# Patient Record
Sex: FEMALE | Race: WHITE | NOT HISPANIC OR LATINO | URBAN - METROPOLITAN AREA
[De-identification: names, ages, dates, MRNs, and addresses within clinical notes are randomized per-mention and may not be internally consistent; named-entity substitution may affect disease eponyms.]

---

## 2023-08-15 ENCOUNTER — OFFICE VISIT (OUTPATIENT)
Dept: FAMILY MEDICINE CLINIC | Facility: CLINIC | Age: 58
End: 2023-08-15
Payer: COMMERCIAL

## 2023-08-15 VITALS
TEMPERATURE: 97.8 F | HEIGHT: 63 IN | DIASTOLIC BLOOD PRESSURE: 88 MMHG | WEIGHT: 142 LBS | HEART RATE: 64 BPM | BODY MASS INDEX: 25.16 KG/M2 | OXYGEN SATURATION: 95 % | SYSTOLIC BLOOD PRESSURE: 138 MMHG

## 2023-08-15 DIAGNOSIS — G43.009 MIGRAINE WITHOUT AURA AND WITHOUT STATUS MIGRAINOSUS, NOT INTRACTABLE: ICD-10-CM

## 2023-08-15 DIAGNOSIS — F33.1 MODERATE EPISODE OF RECURRENT MAJOR DEPRESSIVE DISORDER (HCC): ICD-10-CM

## 2023-08-15 DIAGNOSIS — F19.10 SUBSTANCE ABUSE (HCC): ICD-10-CM

## 2023-08-15 DIAGNOSIS — M25.512 CHRONIC PAIN OF BOTH SHOULDERS: ICD-10-CM

## 2023-08-15 DIAGNOSIS — D50.9 IRON DEFICIENCY ANEMIA, UNSPECIFIED IRON DEFICIENCY ANEMIA TYPE: Primary | ICD-10-CM

## 2023-08-15 DIAGNOSIS — F41.9 ANXIETY: ICD-10-CM

## 2023-08-15 DIAGNOSIS — D32.9 MENINGIOMA (HCC): ICD-10-CM

## 2023-08-15 DIAGNOSIS — E55.9 VITAMIN D DEFICIENCY: ICD-10-CM

## 2023-08-15 DIAGNOSIS — F90.0 ATTENTION DEFICIT HYPERACTIVITY DISORDER (ADHD), PREDOMINANTLY INATTENTIVE TYPE: ICD-10-CM

## 2023-08-15 DIAGNOSIS — M25.511 CHRONIC PAIN OF BOTH SHOULDERS: ICD-10-CM

## 2023-08-15 DIAGNOSIS — G89.29 CHRONIC PAIN OF BOTH SHOULDERS: ICD-10-CM

## 2023-08-15 DIAGNOSIS — Z12.31 ENCOUNTER FOR SCREENING MAMMOGRAM FOR MALIGNANT NEOPLASM OF BREAST: ICD-10-CM

## 2023-08-15 DIAGNOSIS — H54.7 VISUAL IMPAIRMENT: ICD-10-CM

## 2023-08-15 PROBLEM — G43.909 MIGRAINE WITHOUT STATUS MIGRAINOSUS, NOT INTRACTABLE: Status: ACTIVE | Noted: 2023-08-15

## 2023-08-15 PROCEDURE — 99204 OFFICE O/P NEW MOD 45 MIN: CPT | Performed by: FAMILY MEDICINE

## 2023-08-15 RX ORDER — BUSPIRONE HYDROCHLORIDE 10 MG/1
10 TABLET ORAL 2 TIMES DAILY
COMMUNITY

## 2023-08-15 RX ORDER — ARIPIPRAZOLE 20 MG/1
20 TABLET ORAL DAILY
COMMUNITY

## 2023-08-15 RX ORDER — TOPIRAMATE 25 MG/1
25 TABLET ORAL 2 TIMES DAILY
Qty: 180 TABLET | Refills: 3 | Status: SHIPPED | OUTPATIENT
Start: 2023-08-15 | End: 2023-08-17 | Stop reason: SDUPTHER

## 2023-08-15 RX ORDER — SERTRALINE HYDROCHLORIDE 100 MG/1
100 TABLET, FILM COATED ORAL DAILY
COMMUNITY

## 2023-08-15 RX ORDER — ACETAMINOPHEN 500 MG
500 TABLET ORAL EVERY 6 HOURS PRN
COMMUNITY

## 2023-08-15 RX ORDER — MELOXICAM 15 MG/1
15 TABLET ORAL DAILY
Qty: 30 TABLET | Refills: 1 | Status: SHIPPED | OUTPATIENT
Start: 2023-08-15 | End: 2023-08-17 | Stop reason: SDUPTHER

## 2023-08-15 RX ORDER — MELATONIN
1000 DAILY
COMMUNITY
End: 2023-08-15 | Stop reason: SDUPTHER

## 2023-08-15 RX ORDER — MELATONIN
1000 DAILY
Qty: 90 TABLET | Refills: 3 | Status: SHIPPED | OUTPATIENT
Start: 2023-08-15 | End: 2023-08-17 | Stop reason: SDUPTHER

## 2023-08-15 RX ORDER — TOPIRAMATE 25 MG/1
25 TABLET ORAL 2 TIMES DAILY
COMMUNITY
End: 2023-08-15 | Stop reason: SDUPTHER

## 2023-08-15 NOTE — PROGRESS NOTES
Assessment/Plan: Patient will [facility] regarding psychological issues and medications. Patient will start meloxicam for bilateral shoulder pain. Further work-up if persist.  Will restart vitamin D for vitamin D deficiency as well as restart Topamax for migraines. Patient will see ophthalmology regarding visual impairment. Follow-up in 6 months or as needed       Diagnoses and all orders for this visit:    Iron deficiency anemia, unspecified iron deficiency anemia type    Encounter for screening mammogram for malignant neoplasm of breast  -     Mammo screening bilateral w 3d & cad; Future    Anxiety    Moderate episode of recurrent major depressive disorder ([address])    Substance abuse ([address])    Visual impairment  -     Ambulatory Referral to Ophthalmology; Future    Attention deficit hyperactivity disorder (ADHD), predominantly inattentive type    Migraine without aura and without status migrainosus, not intractable  -     topiramate (TOPAMAX) 25 mg tablet; Take 1 tablet (25 mg total) by mouth 2 (two) times a day    Meningioma (HCC)    Chronic pain of both shoulders  -     meloxicam (MOBIC) 15 mg tablet; Take 1 tablet (15 mg total) by mouth daily    Vitamin D deficiency  -     cholecalciferol (VITAMIN D3) 1,000 units tablet; Take 1 tablet (1,000 Units total) by mouth daily Two tablets by mouth daily    Other orders  -     ARIPiprazole (ABILIFY) 20 MG tablet; Take 20 mg by mouth daily  -     busPIRone (BUSPAR) 10 mg tablet; Take 10 mg by mouth 2 (two) times a day  -     sertraline (ZOLOFT) 100 mg tablet; Take 100 mg by mouth daily  -     Discontinue: topiramate (TOPAMAX) 25 mg tablet; Take 25 mg by mouth 2 (two) times a day  -     Discontinue: cholecalciferol (VITAMIN D3) 1,000 units tablet; Take 1,000 Units by mouth daily Two tablets by mouth daily  -     Multiple Vitamin (MULTIVITAMIN ADULT PO); Take by mouth  -     acetaminophen (TYLENOL) 500 mg tablet;  Take 500 mg by mouth every 6 (six) hours as needed for mild pain            Subjective:        Patient ID: Shabnam Navarrete is a 62 y.o. female. Patient is here as a new patient to establish care. Past medical history surgical history allergies medication family history and social history are reviewed at the present time. Patient status post incarceration in April. This was due to probation issues. Patient with history of methamphetamine abuse last use greater than 1 year ago. Patient with bilateral shoulder pain. No chest pain associated with this. No shortness of breath. Urination defecation stable at this time. [facility] [place] for [facility] issues. They are monitoring medications. Patient off vitamin D as well as Topamax since June. Patient with history of migraines. Migraines worse off Topamax. Patient needs refill on this and vitamin D. The following portions of the patient's history were reviewed and updated as appropriate: allergies, current medications, past family history, past medical history, past social history, past surgical history and problem list.      Review of Systems   Constitutional: Negative. HENT: Negative. Eyes: Negative. Respiratory: Negative. Cardiovascular: Negative. Gastrointestinal: Negative. Endocrine: Negative. Genitourinary: Negative. Musculoskeletal: Negative. Skin: Negative. Allergic/Immunologic: Negative. Neurological: Positive for headaches. Hematological: Negative. Psychiatric/Behavioral: Negative. Objective:      BMI Counseling: Body mass index is 25.56 kg/m². The BMI is above normal. Nutrition recommendations include consuming healthier snacks. Exercise recommendations include moderate physical activity 150 minutes/week. Rationale for BMI follow-up plan is due to patient being overweight or obese. Depression Screening and Follow-up Plan: Patient's depression screening was positive with a PHQ-2 score of 6.  Their PHQ-9 score was 24. Continue regular follow-up with their mental health provider who is managing their mental health condition(s). /88 (BP Location: Left arm, Patient Position: Sitting, Cuff Size: Standard)   Pulse 64   Temp 97.8 °F (36.6 °C) (Temporal)   Ht 5' 2.5" (1.588 m)   Wt 64.4 kg (142 lb)   SpO2 95%   BMI 25.56 kg/m²          Physical Exam  Vitals and nursing note reviewed. Constitutional:       General: She is not in acute distress. Appearance: Normal appearance. She is not ill-appearing, toxic-appearing or diaphoretic. HENT:      Head: Normocephalic and atraumatic. Right Ear: Tympanic membrane, ear canal and external ear normal. There is no impacted cerumen. Left Ear: Tympanic membrane, ear canal and external ear normal. There is no impacted cerumen. Nose: Nose normal. No congestion or rhinorrhea. Mouth/Throat:      Mouth: Mucous membranes are moist.      Pharynx: No oropharyngeal exudate or posterior oropharyngeal erythema. Eyes:      General: No scleral icterus. Right eye: No discharge. Left eye: No discharge. Neck:      Vascular: No carotid bruit. Cardiovascular:      Rate and Rhythm: Normal rate and regular rhythm. Pulses: Normal pulses. Heart sounds: Normal heart sounds. No murmur heard. No friction rub. No gallop. Pulmonary:      Effort: Pulmonary effort is normal. No respiratory distress. Breath sounds: Normal breath sounds. No stridor. No wheezing, rhonchi or rales. Chest:      Chest wall: No tenderness. Musculoskeletal:         General: No swelling, tenderness, deformity or signs of injury. Normal range of motion. Cervical back: Normal range of motion and neck supple. No rigidity. No muscular tenderness. Right lower leg: No edema. Left lower leg: No edema. Lymphadenopathy:      Cervical: No cervical adenopathy. Skin:     General: Skin is warm and dry.       Capillary Refill: Capillary refill takes less than 2 seconds. Coloration: Skin is not jaundiced. Findings: No bruising, erythema, lesion or rash. Neurological:      Mental Status: She is alert and oriented to person, place, and time. Mental status is at baseline. Cranial Nerves: No cranial nerve deficit. Sensory: No sensory deficit. Motor: No weakness. Coordination: Coordination normal.      Gait: Gait normal.   Psychiatric:         Behavior: Behavior normal.         Thought Content:  Thought content normal.         Judgment: Judgment normal.

## 2023-08-17 DIAGNOSIS — G43.009 MIGRAINE WITHOUT AURA AND WITHOUT STATUS MIGRAINOSUS, NOT INTRACTABLE: ICD-10-CM

## 2023-08-17 DIAGNOSIS — E55.9 VITAMIN D DEFICIENCY: ICD-10-CM

## 2023-08-17 DIAGNOSIS — M25.512 CHRONIC PAIN OF BOTH SHOULDERS: ICD-10-CM

## 2023-08-17 DIAGNOSIS — G89.29 CHRONIC PAIN OF BOTH SHOULDERS: ICD-10-CM

## 2023-08-17 DIAGNOSIS — M25.511 CHRONIC PAIN OF BOTH SHOULDERS: ICD-10-CM

## 2023-08-17 RX ORDER — MELOXICAM 15 MG/1
15 TABLET ORAL DAILY
Qty: 30 TABLET | Refills: 1 | Status: SHIPPED | OUTPATIENT
Start: 2023-08-17

## 2023-08-17 RX ORDER — TOPIRAMATE 25 MG/1
25 TABLET ORAL 2 TIMES DAILY
Qty: 180 TABLET | Refills: 3 | Status: SHIPPED | OUTPATIENT
Start: 2023-08-17

## 2023-08-17 RX ORDER — MELATONIN
1000 DAILY
Qty: 90 TABLET | Refills: 3 | Status: SHIPPED | OUTPATIENT
Start: 2023-08-17

## 2023-08-17 NOTE — TELEPHONE ENCOUNTER
Patient called stating that the pharmacy never received the medication. I comfirmed with the pharmacy and they did not receive any of the patients medications.

## 2023-08-18 ENCOUNTER — TELEPHONE (OUTPATIENT)
Dept: FAMILY MEDICINE CLINIC | Facility: CLINIC | Age: 58
End: 2023-08-18

## 2023-08-18 NOTE — TELEPHONE ENCOUNTER
Patient called would like to know would she be taking 1 tablet daily of vitamin D3 or 2 tablets daily because the script say 1 daily and also says twice daily.

## 2023-09-14 ENCOUNTER — OFFICE VISIT (OUTPATIENT)
Dept: FAMILY MEDICINE CLINIC | Facility: CLINIC | Age: 58
End: 2023-09-14
Payer: COMMERCIAL

## 2023-09-14 VITALS
WEIGHT: 138.2 LBS | HEART RATE: 91 BPM | TEMPERATURE: 98 F | HEIGHT: 63 IN | DIASTOLIC BLOOD PRESSURE: 86 MMHG | OXYGEN SATURATION: 96 % | SYSTOLIC BLOOD PRESSURE: 128 MMHG | BODY MASS INDEX: 24.49 KG/M2

## 2023-09-14 DIAGNOSIS — J40 BRONCHITIS: Primary | ICD-10-CM

## 2023-09-14 DIAGNOSIS — J06.9 ACUTE URI: ICD-10-CM

## 2023-09-14 DIAGNOSIS — E55.9 VITAMIN D DEFICIENCY: ICD-10-CM

## 2023-09-14 PROCEDURE — 99213 OFFICE O/P EST LOW 20 MIN: CPT | Performed by: FAMILY MEDICINE

## 2023-09-14 PROCEDURE — 87635 SARS-COV-2 COVID-19 AMP PRB: CPT | Performed by: FAMILY MEDICINE

## 2023-09-14 RX ORDER — AZITHROMYCIN 250 MG/1
TABLET, FILM COATED ORAL
Qty: 6 TABLET | Refills: 0 | Status: SHIPPED | OUTPATIENT
Start: 2023-09-14 | End: 2023-09-18

## 2023-09-14 RX ORDER — MELATONIN
1000 DAILY
Qty: 90 TABLET | Refills: 3 | Status: SHIPPED | OUTPATIENT
Start: 2023-09-14

## 2023-09-14 RX ORDER — BUSPIRONE HYDROCHLORIDE 15 MG/1
TABLET ORAL
COMMUNITY
Start: 2023-08-16

## 2023-09-14 NOTE — PROGRESS NOTES
Assessment/Plan: COVID testing done at this time. Patient will rest and increase fluids. Patient use Z-Raul as directed. Diagnoses and all orders for this visit:    Bronchitis  -     azithromycin (ZITHROMAX) 250 mg tablet; Take 2 tablets today then 1 tablet daily x 4 days    Acute URI  -     azithromycin (ZITHROMAX) 250 mg tablet; Take 2 tablets today then 1 tablet daily x 4 days    Vitamin D deficiency  -     cholecalciferol (VITAMIN D3) 1,000 units tablet; Take 1 tablet (1,000 Units total) by mouth daily    Other orders  -     sertraline (ZOLOFT) 50 mg tablet  -     busPIRone (BUSPAR) 15 mg tablet            Subjective:        Patient ID: Shelia Singh is a 62 y.o. female. Patient is here with cough over the past 2 to 3 days. Some sputum production noted. Patient has had some wheezing. Patient also with rhinorrhea. Patient also with headache. No vomiting or diarrhea associated with this. No fever noted. No treatment. No COVID test done. Cough  Associated symptoms include postnasal drip, rhinorrhea, a sore throat and wheezing. Pertinent negatives include no fever. Sore Throat   Associated symptoms include congestion and coughing. The following portions of the patient's history were reviewed and updated as appropriate: allergies, current medications, past family history, past medical history, past social history, past surgical history and problem list.      Review of Systems   Constitutional: Negative for fever. HENT: Positive for congestion, postnasal drip, rhinorrhea, sinus pressure, sinus pain and sore throat. Respiratory: Positive for cough and wheezing. Objective:               /86 (BP Location: Right arm, Patient Position: Sitting, Cuff Size: Standard)   Pulse 91   Temp 98 °F (36.7 °C) (Temporal)   Ht 5' 2.5" (1.588 m)   Wt 62.7 kg (138 lb 3.2 oz)   SpO2 96%   BMI 24.87 kg/m²          Physical Exam  Vitals and nursing note reviewed. Constitutional:       General: She is not in acute distress. Appearance: Normal appearance. She is not ill-appearing, toxic-appearing or diaphoretic. HENT:      Head: Normocephalic and atraumatic. Right Ear: Tympanic membrane, ear canal and external ear normal. There is no impacted cerumen. Left Ear: Tympanic membrane, ear canal and external ear normal. There is no impacted cerumen. Nose: Nose normal. No congestion or rhinorrhea. Mouth/Throat:      Mouth: Mucous membranes are moist.      Pharynx: Posterior oropharyngeal erythema present. No oropharyngeal exudate. Eyes:      General: No scleral icterus. Right eye: No discharge. Left eye: No discharge. Neck:      Vascular: No carotid bruit. Cardiovascular:      Rate and Rhythm: Normal rate and regular rhythm. Pulses: Normal pulses. Heart sounds: Normal heart sounds. No murmur heard. No friction rub. No gallop. Pulmonary:      Effort: Pulmonary effort is normal. No respiratory distress. Breath sounds: Normal breath sounds. No stridor. No wheezing, rhonchi or rales. Chest:      Chest wall: No tenderness. Musculoskeletal:         General: No swelling, tenderness, deformity or signs of injury. Normal range of motion. Cervical back: Normal range of motion and neck supple. No rigidity. No muscular tenderness. Right lower leg: No edema. Left lower leg: No edema. Lymphadenopathy:      Cervical: No cervical adenopathy. Skin:     General: Skin is warm and dry. Capillary Refill: Capillary refill takes less than 2 seconds. Coloration: Skin is not jaundiced. Findings: No bruising, erythema, lesion or rash. Neurological:      Mental Status: She is alert and oriented to person, place, and time. Mental status is at baseline. Cranial Nerves: No cranial nerve deficit. Sensory: No sensory deficit. Motor: No weakness.       Coordination: Coordination normal. Gait: Gait normal.   Psychiatric:         Mood and Affect: Mood normal.         Behavior: Behavior normal.         Thought Content:  Thought content normal.         Judgment: Judgment normal.

## 2023-09-15 LAB — SARS-COV-2 RNA RESP QL NAA+PROBE: NEGATIVE

## 2023-09-18 ENCOUNTER — OFFICE VISIT (OUTPATIENT)
Dept: OBGYN CLINIC | Facility: CLINIC | Age: 58
End: 2023-09-18

## 2023-09-18 VITALS
HEART RATE: 91 BPM | WEIGHT: 140.2 LBS | HEIGHT: 63 IN | SYSTOLIC BLOOD PRESSURE: 116 MMHG | BODY MASS INDEX: 24.84 KG/M2 | DIASTOLIC BLOOD PRESSURE: 78 MMHG

## 2023-09-18 DIAGNOSIS — Z01.419 ENCOUNTER FOR ANNUAL ROUTINE GYNECOLOGICAL EXAMINATION: Primary | ICD-10-CM

## 2023-09-18 PROCEDURE — G0476 HPV COMBO ASSAY CA SCREEN: HCPCS | Performed by: NURSE PRACTITIONER

## 2023-09-18 PROCEDURE — G0145 SCR C/V CYTO,THINLAYER,RESCR: HCPCS | Performed by: NURSE PRACTITIONER

## 2023-09-18 PROCEDURE — 99386 PREV VISIT NEW AGE 40-64: CPT | Performed by: NURSE PRACTITIONER

## 2023-09-18 NOTE — PATIENT INSTRUCTIONS
Schedule mammogram and pelvic U/S  PAP results can take up to 2 weeks  Call with needs or concerns  Return in 1 years for annual GYN exam

## 2023-09-18 NOTE — LETTER
2023    To Kat Moreno  : 1965      This letter is to advise you that your recent PAP SMEAR results were reviewed by me and are NORMAL.   We will see you in 1 year for your annual exam.    Pat Coto

## 2023-09-18 NOTE — PROGRESS NOTES
Assessment/Plan     Diagnoses and all orders for this visit:    Encounter for annual routine gynecological examination  -     Liquid-based pap, screening  -     US pelvis complete non OB; Future         Plan  Patient Instructions   Schedule mammogram and pelvic U/S  PAP results can take up to 2 weeks  Call with needs or concerns  Return in 1 years for annual GYN exam    Return in about 1 year (around 2024) for Annual GYN exam.  Pt verbalized understanding of all discussed. Neva Pisano is a 62 y.o. female who presents for annual exam. The patient has no complaints today. The patient is not sexually active. GYN screening history: last pap: Pt states > 23 years ago, all have been WNL and last mammogram: Never had a mammogram. The patient is not taking hormone replacement therapy. Patient denies post-menopausal vaginal bleeding. . The patient participates in regular exercise: yes. Discussed the benefit of exercise, calcium or vitamin D to decrease the risk of bone Fx after menopauseThe patient reports that there is not domestic violence in her life. The patient is not having menopausal symptoms none    Explained growth was noted at cervical os, examined with Dr. Jessica Babb explained     Depression Screening Follow-up Plan: Patient's depression screening was negative with a PHQ-2 score of 0 . Their PHQ-9 score was 4. Clinically patient does not have depression. No treatment is required. Plans to discuss colonoscopy with Family doctor, pt states at last visit she refused referral       Menstrual History:  OB History        4    Para   3    Term   3            AB   1    Living   3       SAB   1    IAB        Ectopic        Multiple        Live Births   1                Menarche age: 6  No LMP recorded.  Patient is postmenopausal. 52       The following portions of the patient's history were reviewed and updated as appropriate: allergies, current medications, past family history, past medical history, past social history, past surgical history and problem list.    Review of Systems  Pertinent items are noted in HPI.      Objective      /78   Pulse 91   Ht 5' 2.5" (1.588 m)   Wt 63.6 kg (140 lb 3.2 oz)   BMI 25.23 kg/m²      General:   alert and oriented, in no acute distress, alert, appears stated age and cooperative   Heart: regular rate and rhythm, S1, S2 normal, no murmur, click, rub or gallop   Lungs: clear to auscultation bilaterally, WNL respiratory effort, positive cough, (pt states she is being treated for Bronchitis) negative SOB   Thyroid: Negative masses palpable   Abdomen: soft, non-tender, without masses or organomegaly   Vulva: normal   Vagina: normal mucosa   Cervix: no bleeding following Pap, no cervical motion tenderness and growth noted at cervical os (examined by Dr. Ian Jones, unsure if a polyp)   Uterus: normal size, non-tender, normal shape and consistency   Adnexa: normal adnexa   Breasts: NT, negative masses, discharge or dimpling         Lab Review  mammogram to be scheduled

## 2023-09-19 LAB
HPV HR 12 DNA CVX QL NAA+PROBE: NEGATIVE
HPV16 DNA CVX QL NAA+PROBE: NEGATIVE
HPV18 DNA CVX QL NAA+PROBE: NEGATIVE

## 2023-09-21 LAB
LAB AP GYN PRIMARY INTERPRETATION: NORMAL
Lab: NORMAL

## 2023-09-25 ENCOUNTER — HOSPITAL ENCOUNTER (OUTPATIENT)
Dept: ULTRASOUND IMAGING | Facility: HOSPITAL | Age: 58
Discharge: HOME | End: 2023-09-25
Payer: COMMERCIAL

## 2023-09-25 DIAGNOSIS — Z01.419 ENCOUNTER FOR ANNUAL ROUTINE GYNECOLOGICAL EXAMINATION: ICD-10-CM

## 2023-09-25 PROCEDURE — 76856 US EXAM PELVIC COMPLETE: CPT

## 2023-09-25 PROCEDURE — 76830 TRANSVAGINAL US NON-OB: CPT

## 2023-09-28 DIAGNOSIS — E55.9 VITAMIN D DEFICIENCY: ICD-10-CM

## 2023-09-28 DIAGNOSIS — M25.511 CHRONIC PAIN OF BOTH SHOULDERS: ICD-10-CM

## 2023-09-28 DIAGNOSIS — M25.512 CHRONIC PAIN OF BOTH SHOULDERS: ICD-10-CM

## 2023-09-28 DIAGNOSIS — G89.29 CHRONIC PAIN OF BOTH SHOULDERS: ICD-10-CM

## 2023-09-28 DIAGNOSIS — G43.009 MIGRAINE WITHOUT AURA AND WITHOUT STATUS MIGRAINOSUS, NOT INTRACTABLE: ICD-10-CM

## 2023-09-28 RX ORDER — MELOXICAM 15 MG/1
15 TABLET ORAL DAILY
Qty: 30 TABLET | Refills: 1 | Status: SHIPPED | OUTPATIENT
Start: 2023-09-28

## 2023-09-28 RX ORDER — MELATONIN
1000 DAILY
Qty: 90 TABLET | Refills: 3 | Status: SHIPPED | OUTPATIENT
Start: 2023-09-28

## 2023-09-28 RX ORDER — TOPIRAMATE 25 MG/1
25 TABLET ORAL 2 TIMES DAILY
Qty: 180 TABLET | Refills: 3 | Status: SHIPPED | OUTPATIENT
Start: 2023-09-28

## 2023-09-28 NOTE — TELEPHONE ENCOUNTER
[facility] nurse called , pt is now there and needs a written  CORRECTION  order for the tylenol , multi vitamwn d/c 'd or not? PATIENTS  DATE OF ENTRY  TO [facility] WAS ON 06/23/23.  [address] [facility] [phone], [facility] [phone]

## 2023-10-11 DIAGNOSIS — R06.2 WHEEZING: Primary | ICD-10-CM

## 2023-10-11 DIAGNOSIS — E56.9 VITAMIN DEFICIENCY: ICD-10-CM

## 2023-10-11 DIAGNOSIS — E53.9 VITAMIN B DEFICIENCY: ICD-10-CM

## 2023-10-11 NOTE — TELEPHONE ENCOUNTER
Chau Vogt RN from [facility] stated that she has been trying to get medications orders for the past few weeks. Patient needs scripts for over the counter Multivitamin and Vitamin B12. Pt needs a script for PRO Air RespiClick inhaler. This inhaler was prescribed during a visit at [facility] on 08/28/2023. The instructions were, 1 puff every 4 hours, however the patient is using it every day 1 to 2 times a day. RN stated patient is a smoker and has wheezing. RN also said that she would like the patient evaluated by PCP to decide if inhaler should be a standing order or a PRN. Patient has Dr Keren Dean and Body Wash, but it has melatonin, RN stated, if it is okay for patient to use, for PCP to please submit something in writing. Scripts are to be sent to [facility]. Fax # [phone]. For further details please contact Chau Vogt RN at her personal number at [phone].

## 2023-10-11 NOTE — TELEPHONE ENCOUNTER
Notified Lenka Coronado of medications and let her know once sent to pharmacy, patients med list needs will be faxed to Fax # [phone]. For further details please contact Lenka Coronado RN at her personal number at [phone].

## 2023-10-12 ENCOUNTER — TELEPHONE (OUTPATIENT)
Dept: FAMILY MEDICINE CLINIC | Facility: CLINIC | Age: 58
End: 2023-10-12

## 2023-10-12 DIAGNOSIS — R06.2 WHEEZING: ICD-10-CM

## 2023-10-12 RX ORDER — BUDESONIDE AND FORMOTEROL FUMARATE DIHYDRATE 160; 4.5 UG/1; UG/1
2 AEROSOL RESPIRATORY (INHALATION) 2 TIMES DAILY
Qty: 10.2 G | Refills: 5 | Status: SHIPPED | OUTPATIENT
Start: 2023-10-12 | End: 2023-10-18 | Stop reason: SDUPTHER

## 2023-10-12 RX ORDER — ALBUTEROL SULFATE 90 UG/1
2 AEROSOL, METERED RESPIRATORY (INHALATION) EVERY 4 HOURS PRN
Qty: 18 G | Refills: 2 | Status: SHIPPED | OUTPATIENT
Start: 2023-10-12

## 2023-10-12 RX ORDER — ELECTROLYTES/DEXTROSE
SOLUTION, ORAL ORAL
Qty: 90 TABLET | Refills: 1 | Status: SHIPPED | OUTPATIENT
Start: 2023-10-12

## 2023-10-12 NOTE — TELEPHONE ENCOUNTER
Rep from [facility] called and states [facility] called and contacted them advising Symbicort needs prior authorization.     [phone]

## 2023-10-18 RX ORDER — BUDESONIDE AND FORMOTEROL FUMARATE DIHYDRATE 160; 4.5 UG/1; UG/1
2 AEROSOL RESPIRATORY (INHALATION) 2 TIMES DAILY
Qty: 10.2 G | Refills: 5 | Status: SHIPPED | OUTPATIENT
Start: 2023-10-18

## 2023-10-18 NOTE — TELEPHONE ENCOUNTER
Received response back from [facility] is not required as BRAND Symbicort is preferred on the formulary. (Note made on notification a paid claim was received from the pharmacy on 10/12/23 for brand and no further action needed at this time.)    Notification faxed to [facility] and [facility].

## 2023-11-13 PROBLEM — J06.9 ACUTE URI: Status: RESOLVED | Noted: 2023-09-14 | Resolved: 2023-11-13

## 2023-11-14 ENCOUNTER — OFFICE VISIT (OUTPATIENT)
Dept: FAMILY MEDICINE CLINIC | Facility: CLINIC | Age: 58
End: 2023-11-14
Payer: COMMERCIAL

## 2023-11-14 VITALS
TEMPERATURE: 99.3 F | OXYGEN SATURATION: 96 % | DIASTOLIC BLOOD PRESSURE: 80 MMHG | HEART RATE: 86 BPM | HEIGHT: 63 IN | WEIGHT: 135 LBS | SYSTOLIC BLOOD PRESSURE: 120 MMHG | BODY MASS INDEX: 23.92 KG/M2

## 2023-11-14 DIAGNOSIS — J06.9 ACUTE URI: Primary | ICD-10-CM

## 2023-11-14 PROCEDURE — 87636 SARSCOV2 & INF A&B AMP PRB: CPT | Performed by: FAMILY MEDICINE

## 2023-11-14 PROCEDURE — 99213 OFFICE O/P EST LOW 20 MIN: CPT | Performed by: FAMILY MEDICINE

## 2023-11-14 RX ORDER — BUDESONIDE AND FORMOTEROL FUMARATE DIHYDRATE 160; 4.5 UG/1; UG/1
2 AEROSOL RESPIRATORY (INHALATION) 2 TIMES DAILY
COMMUNITY

## 2023-11-14 RX ORDER — AMOXICILLIN 500 MG/1
1000 CAPSULE ORAL EVERY 12 HOURS SCHEDULED
Qty: 28 CAPSULE | Refills: 0 | Status: SHIPPED | OUTPATIENT
Start: 2023-11-14 | End: 2023-11-21

## 2023-11-14 NOTE — PROGRESS NOTES
Assessment/Plan: Note for patient to return to work on Thursday. Patient use amoxicillin as directed. COVID test done at this time. Other guidance given. Diagnoses and all orders for this visit:    Acute URI  -     amoxicillin (AMOXIL) 500 mg capsule; Take 2 capsules (1,000 mg total) by mouth every 12 (twelve) hours for 7 days    Other orders  -     budesonide-formoterol (SYMBICORT) 160-4.5 mcg/act inhaler; Inhale 2 puffs 2 (two) times a day Rinse mouth after use. Subjective:        Patient ID: Lance Vasquez is a 62 y.o. female. Patient is here with congestion, cough, sore throat and fatigue over the past few days. Patient with rhinorrhea and postnasal drip. Patient with some nausea but no vomiting or diarrhea. No COVID test done yet. The following portions of the patient's history were reviewed and updated as appropriate: allergies, current medications, past family history, past medical history, past social history, past surgical history and problem list.      Review of Systems   Constitutional:  Positive for fatigue. HENT:  Positive for congestion and sore throat. Eyes: Negative. Respiratory:  Positive for cough. Cardiovascular: Negative. Gastrointestinal: Negative. Endocrine: Negative. Genitourinary: Negative. Musculoskeletal:  Positive for arthralgias. Skin: Negative. Allergic/Immunologic: Negative. Neurological: Negative. Hematological: Negative. Psychiatric/Behavioral: Negative. Objective: Tobacco Cessation Counseling: The patient is sincerely urged to quit consumption of tobacco. She is ready to quit tobacco.             /80   Pulse 86   Temp 99.3 °F (37.4 °C)   Ht 5' 3" (1.6 m)   Wt 61.2 kg (135 lb)   SpO2 96%   BMI 23.91 kg/m²          Physical Exam  Vitals and nursing note reviewed. Constitutional:       General: She is not in acute distress. Appearance: She is ill-appearing.  She is not toxic-appearing or diaphoretic. HENT:      Head: Normocephalic and atraumatic. Right Ear: Tympanic membrane, ear canal and external ear normal. There is no impacted cerumen. Left Ear: Tympanic membrane, ear canal and external ear normal. There is no impacted cerumen. Nose: Rhinorrhea present. No congestion. Mouth/Throat:      Mouth: Mucous membranes are moist.      Pharynx: Oropharyngeal exudate present. No posterior oropharyngeal erythema. Eyes:      General: No scleral icterus. Right eye: No discharge. Left eye: No discharge. Neck:      Vascular: No carotid bruit. Cardiovascular:      Rate and Rhythm: Normal rate and regular rhythm. Pulses: Normal pulses. Heart sounds: Normal heart sounds. No murmur heard. No friction rub. No gallop. Pulmonary:      Effort: Pulmonary effort is normal. No respiratory distress. Breath sounds: Normal breath sounds. No stridor. No wheezing, rhonchi or rales. Chest:      Chest wall: No tenderness. Musculoskeletal:         General: No swelling, tenderness, deformity or signs of injury. Normal range of motion. Cervical back: Normal range of motion and neck supple. No rigidity. No muscular tenderness. Right lower leg: No edema. Left lower leg: No edema. Lymphadenopathy:      Cervical: No cervical adenopathy. Skin:     General: Skin is warm and dry. Capillary Refill: Capillary refill takes less than 2 seconds. Coloration: Skin is not jaundiced. Findings: No bruising, erythema, lesion or rash. Neurological:      Mental Status: She is alert and oriented to person, place, and time. Mental status is at baseline. Cranial Nerves: No cranial nerve deficit. Sensory: No sensory deficit. Motor: No weakness.       Coordination: Coordination normal.      Gait: Gait normal.   Psychiatric:         Mood and Affect: Mood normal.         Behavior: Behavior normal.         Thought Content: Thought content normal.         Judgment: Judgment normal.

## 2023-11-14 NOTE — LETTER
November 14, 2023     Patient: Kat Virknacle  YOB: 1965  Date of Visit: 11/14/2023      To Whom it May Concern:    Elvia Velarde is under my professional care. Arian Waller was seen in my office on 11/14/2023. Arian Waller may return to work on 11/16/2023 . If you have any questions or concerns, please don't hesitate to call.          Sincerely,          Jake Aguirre, DO

## 2023-11-15 LAB
FLUAV RNA RESP QL NAA+PROBE: NEGATIVE
FLUBV RNA RESP QL NAA+PROBE: NEGATIVE
SARS-COV-2 RNA RESP QL NAA+PROBE: NEGATIVE

## 2024-01-11 ENCOUNTER — TELEPHONE (OUTPATIENT)
Age: 59
End: 2024-01-11

## 2024-01-11 DIAGNOSIS — Z72.0 TOBACCO ABUSE: Primary | ICD-10-CM

## 2024-01-12 NOTE — TELEPHONE ENCOUNTER
Called and spoke with patient and has no idea what I'm taking about but would like to know if you can call her in the nicorette gum 4mg?

## 2024-01-13 PROBLEM — J06.9 ACUTE URI: Status: RESOLVED | Noted: 2023-11-14 | Resolved: 2024-01-13

## 2024-01-15 DIAGNOSIS — G43.009 MIGRAINE WITHOUT AURA AND WITHOUT STATUS MIGRAINOSUS, NOT INTRACTABLE: ICD-10-CM

## 2024-01-15 DIAGNOSIS — R06.2 WHEEZING: ICD-10-CM

## 2024-01-15 RX ORDER — TOPIRAMATE 25 MG/1
25 TABLET ORAL 2 TIMES DAILY
Qty: 180 TABLET | Refills: 3 | Status: SHIPPED | OUTPATIENT
Start: 2024-01-15

## 2024-01-15 RX ORDER — BUDESONIDE AND FORMOTEROL FUMARATE DIHYDRATE 160; 4.5 UG/1; UG/1
2 AEROSOL RESPIRATORY (INHALATION) 2 TIMES DAILY
Qty: 10.2 G | Refills: 5 | Status: SHIPPED | OUTPATIENT
Start: 2024-01-15

## 2024-01-15 NOTE — TELEPHONE ENCOUNTER
Orders came over from [facility] requesting her scripts for symbicort and topiramate be change to 8am and HS

## 2024-02-19 ENCOUNTER — TELEPHONE (OUTPATIENT)
Age: 59
End: 2024-02-19

## 2024-02-21 PROBLEM — Z00.00 GENERAL MEDICAL EXAMINATION: Status: RESOLVED | Noted: 2018-11-12 | Resolved: 2024-02-21

## 2024-02-22 ENCOUNTER — OFFICE VISIT (OUTPATIENT)
Dept: FAMILY MEDICINE CLINIC | Facility: CLINIC | Age: 59
End: 2024-02-22
Payer: COMMERCIAL

## 2024-02-22 VITALS
DIASTOLIC BLOOD PRESSURE: 82 MMHG | BODY MASS INDEX: 23.96 KG/M2 | SYSTOLIC BLOOD PRESSURE: 114 MMHG | OXYGEN SATURATION: 97 % | HEART RATE: 93 BPM | HEIGHT: 63 IN | WEIGHT: 135.2 LBS | TEMPERATURE: 98.7 F

## 2024-02-22 DIAGNOSIS — Z00.00 WELL ADULT EXAM: Primary | ICD-10-CM

## 2024-02-22 DIAGNOSIS — Z12.31 ENCOUNTER FOR SCREENING MAMMOGRAM FOR MALIGNANT NEOPLASM OF BREAST: ICD-10-CM

## 2024-02-22 PROCEDURE — 99396 PREV VISIT EST AGE 40-64: CPT | Performed by: FAMILY MEDICINE

## 2024-02-22 RX ORDER — TRAZODONE HYDROCHLORIDE 50 MG/1
TABLET ORAL
COMMUNITY
Start: 2023-12-12

## 2024-02-22 RX ORDER — ATOMOXETINE 80 MG/1
CAPSULE ORAL
COMMUNITY
Start: 2024-02-12

## 2024-02-22 RX ORDER — ALBUTEROL SULFATE 90 UG/1
AEROSOL, METERED RESPIRATORY (INHALATION)
COMMUNITY
Start: 2024-01-19

## 2024-02-22 RX ORDER — BUSPIRONE HYDROCHLORIDE 30 MG/1
TABLET ORAL
COMMUNITY
Start: 2024-01-31

## 2024-02-22 RX ORDER — BUSPIRONE HYDROCHLORIDE 15 MG/1
TABLET ORAL
COMMUNITY
Start: 2024-02-16

## 2024-02-22 NOTE — PROGRESS NOTES
Assessment/Plan: Wellness exam done at this time.  Patient had laboratory studies done as well as QuantiFERON gold for work.  Patient to see gynecologist.  Patient will go for mammogram.  Patient wishes to hold off with colorectal screening at this time.  Patient will try to quit smoking use Nicorette as directed.  Follow-up in 6 months       Diagnoses and all orders for this visit:    Well adult exam  -     Ambulatory Referral to Colorectal Surgery; Future  -     CBC and differential; Future  -     Comprehensive metabolic panel; Future  -     TSH, 3rd generation with Free T4 reflex; Future  -     Lipid panel; Future  -     Quantiferon TB Gold Plus Assay; Future    Other orders  -     albuterol (PROVENTIL HFA,VENTOLIN HFA) 90 mcg/act inhaler  -     atomoxetine (STRATTERA) 80 MG capsule  -     busPIRone (BUSPAR) 15 mg tablet  -     busPIRone (BUSPAR) 30 MG tablet  -     traZODone (DESYREL) 50 mg tablet            Subjective:        Patient ID: Herminia De Paz is a 58 y.o. female.      Patient is here for wellness exam.  Labs and vaccines reviewed.  Patient did see gynecologist last summer.  No recent mammogram done.  No recent colorectal screening.          The following portions of the patient's history were reviewed and updated as appropriate: allergies, current medications, past family history, past medical history, past social history, past surgical history and problem list.      Review of Systems   Constitutional: Negative.    HENT: Negative.     Eyes: Negative.    Respiratory: Negative.     Cardiovascular: Negative.    Gastrointestinal: Negative.    Endocrine: Negative.    Genitourinary: Negative.    Musculoskeletal: Negative.    Skin: Negative.    Allergic/Immunologic: Negative.    Neurological: Negative.    Hematological: Negative.    Psychiatric/Behavioral: Negative.             Objective:        Depression Screening and Follow-up Plan: Patient was screened for depression during today's encounter. They  "screened negative with a PHQ-9 score of 3.            /82 (BP Location: Left arm, Patient Position: Sitting, Cuff Size: Standard)   Pulse 93   Temp 98.7 °F (37.1 °C) (Temporal)   Ht 5' 3\" (1.6 m)   Wt 61.3 kg (135 lb 3.2 oz)   SpO2 97%   BMI 23.95 kg/m²          Physical Exam  Vitals and nursing note reviewed.   Constitutional:       General: She is not in acute distress.     Appearance: Normal appearance. She is not ill-appearing, toxic-appearing or diaphoretic.   HENT:      Head: Normocephalic and atraumatic.      Right Ear: Tympanic membrane, ear canal and external ear normal. There is no impacted cerumen.      Left Ear: Tympanic membrane, ear canal and external ear normal. There is no impacted cerumen.      Nose: Nose normal. No congestion or rhinorrhea.      Mouth/Throat:      Mouth: Mucous membranes are moist.      Pharynx: No oropharyngeal exudate or posterior oropharyngeal erythema.   Eyes:      General: No scleral icterus.        Right eye: No discharge.         Left eye: No discharge.   Neck:      Vascular: No carotid bruit.   Cardiovascular:      Rate and Rhythm: Normal rate and regular rhythm.      Pulses: Normal pulses.      Heart sounds: Normal heart sounds. No murmur heard.     No friction rub. No gallop.   Pulmonary:      Effort: Pulmonary effort is normal. No respiratory distress.      Breath sounds: Normal breath sounds. No stridor. No wheezing, rhonchi or rales.   Chest:      Chest wall: No tenderness.   Musculoskeletal:         General: No swelling, tenderness, deformity or signs of injury. Normal range of motion.      Cervical back: Normal range of motion and neck supple. No rigidity. No muscular tenderness.      Right lower leg: No edema.      Left lower leg: No edema.   Lymphadenopathy:      Cervical: No cervical adenopathy.   Skin:     General: Skin is warm and dry.      Capillary Refill: Capillary refill takes less than 2 seconds.      Coloration: Skin is not jaundiced.      " Findings: No bruising, erythema, lesion or rash.   Neurological:      Mental Status: She is alert and oriented to person, place, and time. Mental status is at baseline.      Cranial Nerves: No cranial nerve deficit.      Sensory: No sensory deficit.      Motor: No weakness.      Coordination: Coordination normal.      Gait: Gait normal.   Psychiatric:         Mood and Affect: Mood normal.         Behavior: Behavior normal.         Thought Content: Thought content normal.         Judgment: Judgment normal.

## 2024-02-26 DIAGNOSIS — G43.009 MIGRAINE WITHOUT AURA AND WITHOUT STATUS MIGRAINOSUS, NOT INTRACTABLE: ICD-10-CM

## 2024-02-26 RX ORDER — TOPIRAMATE 25 MG/1
TABLET ORAL
Qty: 60 TABLET | Refills: 0 | Status: SHIPPED | OUTPATIENT
Start: 2024-02-26

## 2024-02-28 ENCOUNTER — TELEPHONE (OUTPATIENT)
Dept: FAMILY MEDICINE CLINIC | Facility: CLINIC | Age: 59
End: 2024-02-28

## 2024-02-28 ENCOUNTER — LAB (OUTPATIENT)
Dept: LAB | Facility: HOSPITAL | Age: 59
End: 2024-02-28
Payer: COMMERCIAL

## 2024-02-28 DIAGNOSIS — E03.9 HYPOTHYROIDISM, UNSPECIFIED TYPE: Primary | ICD-10-CM

## 2024-02-28 DIAGNOSIS — E78.2 MIXED HYPERLIPIDEMIA: ICD-10-CM

## 2024-02-28 DIAGNOSIS — Z00.00 WELL ADULT EXAM: ICD-10-CM

## 2024-02-28 LAB
ALBUMIN SERPL BCP-MCNC: 4.2 G/DL (ref 3.5–5)
ALP SERPL-CCNC: 82 U/L (ref 34–104)
ALT SERPL W P-5'-P-CCNC: 9 U/L (ref 7–52)
ANION GAP SERPL CALCULATED.3IONS-SCNC: 5 MMOL/L
AST SERPL W P-5'-P-CCNC: 12 U/L (ref 13–39)
BASOPHILS # BLD AUTO: 0.05 THOUSANDS/ÂΜL (ref 0–0.1)
BASOPHILS NFR BLD AUTO: 1 % (ref 0–1)
BILIRUB SERPL-MCNC: 0.32 MG/DL (ref 0.2–1)
BUN SERPL-MCNC: 14 MG/DL (ref 5–25)
CALCIUM SERPL-MCNC: 9.7 MG/DL (ref 8.4–10.2)
CHLORIDE SERPL-SCNC: 108 MMOL/L (ref 96–108)
CHOLEST SERPL-MCNC: 261 MG/DL
CO2 SERPL-SCNC: 25 MMOL/L (ref 21–32)
CREAT SERPL-MCNC: 0.81 MG/DL (ref 0.6–1.3)
EOSINOPHIL # BLD AUTO: 0.22 THOUSAND/ÂΜL (ref 0–0.61)
EOSINOPHIL NFR BLD AUTO: 3 % (ref 0–6)
ERYTHROCYTE [DISTWIDTH] IN BLOOD BY AUTOMATED COUNT: 12.9 % (ref 11.6–15.1)
GFR SERPL CREATININE-BSD FRML MDRD: 80 ML/MIN/1.73SQ M
GLUCOSE P FAST SERPL-MCNC: 81 MG/DL (ref 65–99)
HCT VFR BLD AUTO: 42.6 % (ref 34.8–46.1)
HDLC SERPL-MCNC: 69 MG/DL
HGB BLD-MCNC: 13.7 G/DL (ref 11.5–15.4)
IMM GRANULOCYTES # BLD AUTO: 0.03 THOUSAND/UL (ref 0–0.2)
IMM GRANULOCYTES NFR BLD AUTO: 0 % (ref 0–2)
LDLC SERPL CALC-MCNC: 167 MG/DL (ref 0–100)
LYMPHOCYTES # BLD AUTO: 1.95 THOUSANDS/ÂΜL (ref 0.6–4.47)
LYMPHOCYTES NFR BLD AUTO: 27 % (ref 14–44)
MCH RBC QN AUTO: 30.2 PG (ref 26.8–34.3)
MCHC RBC AUTO-ENTMCNC: 32.2 G/DL (ref 31.4–37.4)
MCV RBC AUTO: 94 FL (ref 82–98)
MONOCYTES # BLD AUTO: 0.32 THOUSAND/ÂΜL (ref 0.17–1.22)
MONOCYTES NFR BLD AUTO: 5 % (ref 4–12)
NEUTROPHILS # BLD AUTO: 4.62 THOUSANDS/ÂΜL (ref 1.85–7.62)
NEUTS SEG NFR BLD AUTO: 64 % (ref 43–75)
NONHDLC SERPL-MCNC: 192 MG/DL
NRBC BLD AUTO-RTO: 0 /100 WBCS
PLATELET # BLD AUTO: 311 THOUSANDS/UL (ref 149–390)
PMV BLD AUTO: 9.2 FL (ref 8.9–12.7)
POTASSIUM SERPL-SCNC: 4 MMOL/L (ref 3.5–5.3)
PROT SERPL-MCNC: 7.4 G/DL (ref 6.4–8.4)
RBC # BLD AUTO: 4.54 MILLION/UL (ref 3.81–5.12)
SODIUM SERPL-SCNC: 138 MMOL/L (ref 135–147)
T4 FREE SERPL-MCNC: 0.53 NG/DL (ref 0.61–1.12)
TRIGL SERPL-MCNC: 125 MG/DL
TSH SERPL DL<=0.05 MIU/L-ACNC: 4.57 UIU/ML (ref 0.45–4.5)
WBC # BLD AUTO: 7.19 THOUSAND/UL (ref 4.31–10.16)

## 2024-02-28 PROCEDURE — 86480 TB TEST CELL IMMUN MEASURE: CPT

## 2024-02-28 PROCEDURE — 84439 ASSAY OF FREE THYROXINE: CPT

## 2024-02-28 PROCEDURE — 84443 ASSAY THYROID STIM HORMONE: CPT

## 2024-02-28 PROCEDURE — 80061 LIPID PANEL: CPT

## 2024-02-28 PROCEDURE — 85025 COMPLETE CBC W/AUTO DIFF WBC: CPT

## 2024-02-28 PROCEDURE — 36415 COLL VENOUS BLD VENIPUNCTURE: CPT

## 2024-02-28 PROCEDURE — 80053 COMPREHEN METABOLIC PANEL: CPT

## 2024-02-28 NOTE — RESULT ENCOUNTER NOTE
Spoke with patient and advised of lab results. Patient verbalized understanding and agrees, No further questions or concerns at the time of phone call.

## 2024-02-28 NOTE — TELEPHONE ENCOUNTER
----- Message from Yinka Mitchell DO sent at 2/28/2024 11:09 AM EST -----  Call patient.  Patient with elevated TSH as well as cholesterol 261.  Low-fat low-cholesterol diet recommended.  Patient will have lipid profile, TSH prior to next visit in 6 months.

## 2024-02-28 NOTE — TELEPHONE ENCOUNTER
Patient made aware of lab results. Orders pended for lipid panel and TSH. Please sign.    Thank you

## 2024-02-29 LAB
GAMMA INTERFERON BACKGROUND BLD IA-ACNC: 0.03 IU/ML
M TB IFN-G BLD-IMP: NEGATIVE
M TB IFN-G CD4+ BCKGRND COR BLD-ACNC: -0.02 IU/ML
M TB IFN-G CD4+ BCKGRND COR BLD-ACNC: -0.03 IU/ML
MITOGEN IGNF BCKGRD COR BLD-ACNC: 4.11 IU/ML

## 2024-04-11 ENCOUNTER — TELEPHONE (OUTPATIENT)
Dept: PSYCHIATRY | Facility: CLINIC | Age: 59
End: 2024-04-11

## 2024-04-11 NOTE — TELEPHONE ENCOUNTER
Patient has been added to the Medication Management and Talk Therapy wait list without a referral.    Insurance: Martha KING  Insurance Type:    Commercial []   Medicaid [x]   [facility] (if applicable)   Medicare []  Location Preference: [facility]  Provider Preference: any  Virtual: Yes [x] No []  Were outside resources sent: Yes [x] No []

## 2024-04-22 PROBLEM — Z00.00 WELL ADULT EXAM: Status: RESOLVED | Noted: 2024-02-22 | Resolved: 2024-04-22

## 2024-05-03 DIAGNOSIS — G43.009 MIGRAINE WITHOUT AURA AND WITHOUT STATUS MIGRAINOSUS, NOT INTRACTABLE: ICD-10-CM

## 2024-05-03 RX ORDER — TOPIRAMATE 25 MG/1
TABLET ORAL
Qty: 60 TABLET | Refills: 1 | Status: SHIPPED | OUTPATIENT
Start: 2024-05-03

## 2024-05-07 ENCOUNTER — VBI (OUTPATIENT)
Dept: ADMINISTRATIVE | Facility: OTHER | Age: 59
End: 2024-05-07

## 2024-05-07 ENCOUNTER — HOSPITAL ENCOUNTER (EMERGENCY)
Facility: HOSPITAL | Age: 59
Discharge: HOME | End: 2024-05-07
Attending: EMERGENCY MEDICINE
Payer: COMMERCIAL

## 2024-05-07 VITALS
WEIGHT: 137.35 LBS | DIASTOLIC BLOOD PRESSURE: 104 MMHG | BODY MASS INDEX: 24.33 KG/M2 | RESPIRATION RATE: 18 BRPM | SYSTOLIC BLOOD PRESSURE: 143 MMHG | OXYGEN SATURATION: 97 % | HEART RATE: 85 BPM | TEMPERATURE: 98 F

## 2024-05-07 DIAGNOSIS — G43.909 MIGRAINE HEADACHE: Primary | ICD-10-CM

## 2024-05-07 PROCEDURE — 96375 TX/PRO/DX INJ NEW DRUG ADDON: CPT

## 2024-05-07 PROCEDURE — 99282 EMERGENCY DEPT VISIT SF MDM: CPT

## 2024-05-07 PROCEDURE — 96365 THER/PROPH/DIAG IV INF INIT: CPT

## 2024-05-07 PROCEDURE — 99284 EMERGENCY DEPT VISIT MOD MDM: CPT | Performed by: EMERGENCY MEDICINE

## 2024-05-07 RX ORDER — MAGNESIUM SULFATE HEPTAHYDRATE 40 MG/ML
2 INJECTION, SOLUTION INTRAVENOUS ONCE
Status: COMPLETED | OUTPATIENT
Start: 2024-05-07 | End: 2024-05-07

## 2024-05-07 RX ORDER — METOCLOPRAMIDE HYDROCHLORIDE 5 MG/ML
10 INJECTION INTRAMUSCULAR; INTRAVENOUS ONCE
Status: COMPLETED | OUTPATIENT
Start: 2024-05-07 | End: 2024-05-07

## 2024-05-07 RX ORDER — BUTALBITAL, ACETAMINOPHEN AND CAFFEINE 300; 40; 50 MG/1; MG/1; MG/1
2 CAPSULE ORAL EVERY 4 HOURS PRN
Qty: 20 CAPSULE | Refills: 0 | Status: SHIPPED | OUTPATIENT
Start: 2024-05-07 | End: 2024-05-10

## 2024-05-07 RX ORDER — DEXAMETHASONE SODIUM PHOSPHATE 10 MG/ML
10 INJECTION, SOLUTION INTRAMUSCULAR; INTRAVENOUS ONCE
Status: COMPLETED | OUTPATIENT
Start: 2024-05-07 | End: 2024-05-07

## 2024-05-07 RX ORDER — KETOROLAC TROMETHAMINE 30 MG/ML
30 INJECTION, SOLUTION INTRAMUSCULAR; INTRAVENOUS ONCE
Status: COMPLETED | OUTPATIENT
Start: 2024-05-07 | End: 2024-05-07

## 2024-05-07 RX ADMIN — METOCLOPRAMIDE 10 MG: 5 INJECTION, SOLUTION INTRAMUSCULAR; INTRAVENOUS at 17:44

## 2024-05-07 RX ADMIN — MAGNESIUM SULFATE HEPTAHYDRATE 2 G: 40 INJECTION, SOLUTION INTRAVENOUS at 17:45

## 2024-05-07 RX ADMIN — KETOROLAC TROMETHAMINE 30 MG: 30 INJECTION, SOLUTION INTRAMUSCULAR; INTRAVENOUS at 17:41

## 2024-05-07 RX ADMIN — DEXAMETHASONE SODIUM PHOSPHATE 10 MG: 10 INJECTION INTRAMUSCULAR; INTRAVENOUS at 17:43

## 2024-05-07 NOTE — ED PROVIDER NOTES
History  Chief Complaint   Patient presents with    Headache     Headache x3 days. Hx of migraines. Taking Topamax.      Patient is a 58-year-old female.  She has a long history of migraine headaches.  Usually she experiences some left-sided facial numbness with her migraines.  She reports onset of migraine 3 days ago.  Gradual onset.  Not sudden onset of maximal headache.  Yesterday the headache became pretty bad.  No vomiting.  She does have some left-sided facial numbness.  She is also experiencing some dizziness.  No other motor or sensory complaints.  No speech or visual complaints.  No fever, neck pain, or mental status changes.                      Prior to Admission Medications   Prescriptions Last Dose Informant Patient Reported? Taking?   ARIPiprazole (ABILIFY) 20 MG tablet   Yes No   Sig: Take 20 mg by mouth daily   Multiple Vitamin (Multivitamin Adult) TABS   No No   Sig: Take 1 pill daily   albuterol (PROVENTIL HFA,VENTOLIN HFA) 90 mcg/act inhaler   Yes No   atomoxetine (STRATTERA) 80 MG capsule   Yes No   budesonide-formoterol (SYMBICORT) 160-4.5 mcg/act inhaler   Yes No   Sig: Inhale 2 puffs 2 (two) times a day Rinse mouth after use.   budesonide-formoterol (Symbicort) 160-4.5 mcg/act inhaler   No No   Sig: Inhale 2 puffs 2 (two) times a day At 8AM and HS.Rinse mouth after use.   busPIRone (BUSPAR) 15 mg tablet   Yes No   busPIRone (BUSPAR) 30 MG tablet   Yes No   cholecalciferol (VITAMIN D3) 1,000 units tablet   No No   Sig: Take 1 tablet (1,000 Units total) by mouth daily   nicotine polacrilex (NICORETTE) 4 mg gum   No No   Sig: Chew 1 each (4 mg total) as needed for smoking cessation   sertraline (ZOLOFT) 100 mg tablet  Self Yes No   Sig: Take 100 mg by mouth daily   sertraline (ZOLOFT) 50 mg tablet   Yes No   topiramate (TOPAMAX) 25 mg tablet   No No   Sig: TAKE 1 TABLET BY MOUTH 2X DAILY @8AM-8PM (MIGRAINE) BIMAL   traZODone (DESYREL) 50 mg tablet   Yes No   vitamin B-12 (CYANOCOBALAMIN) 250 MCG  tablet   No No   Sig: Take 4 tablets (1,000 mcg total) by mouth daily      Facility-Administered Medications: None       Past Medical History:   Diagnosis Date    ADHD (attention deficit hyperactivity disorder)     Allergic     Anemia     Anxiety     COPD (chronic obstructive pulmonary disease) ([facility])     Depression     Headache(784.0)     Hemangioma     Hypertension     Panic attack     Panic disorder     Psychiatric illness     PTSD (post-traumatic stress disorder)     Self-injurious behavior     Sleep difficulties     Substance abuse ([facility])     Substance abuse ([facility]) 08/15/2023    Suicide attempt ([facility])     Visual impairment        Past Surgical History:   Procedure Laterality Date     SECTION       SECTION         Family History   Problem Relation Age of Onset    Cancer Mother     Autoimmune disease Mother     COPD Mother     Depression Mother     Dementia Mother     Alzheimer's disease Mother     Alzheimer's disease Father     Dementia Father     No Known Problems Sister     No Known Problems Brother     Depression Daughter     No Known Problems Maternal Grandmother     No Known Problems Maternal Grandfather     No Known Problems Paternal Grandmother     No Known Problems Paternal Grandfather     No Known Problems Maternal Aunt     No Known Problems Maternal Uncle     No Known Problems Paternal Aunt     No Known Problems Paternal Uncle     No Known Problems Cousin     ADD / ADHD Neg Hx     Alcohol abuse Neg Hx     Anxiety disorder Neg Hx     Bipolar disorder Neg Hx     Completed Suicide  Neg Hx     Drug abuse Neg Hx     OCD Neg Hx     Psychiatric Illness Neg Hx     Psychosis Neg Hx     Schizoaffective Disorder  Neg Hx     Schizophrenia Neg Hx     Self-Injury Neg Hx     Suicide Attempts Neg Hx      I have reviewed and agree with the history as documented.    E-Cigarette/Vaping    E-Cigarette Use Current Some Day User      E-Cigarette/Vaping Substances    Nicotine Yes     Flavoring Yes      Social  History     Tobacco Use    Smoking status: Every Day     Current packs/day: 0.50     Average packs/day: 0.5 packs/day for 35.0 years (17.5 ttl pk-yrs)     Types: Cigarettes    Smokeless tobacco: Never    Tobacco comments:     declines   Vaping Use    Vaping status: Some Days    Substances: Nicotine, Flavoring   Substance Use Topics    Alcohol use: Not Currently    Drug use: Not Currently     Types: Amphetamines, Methamphetamines     Comment: denies, reports positive uds is from allegra       Review of Systems   Constitutional:  Negative for chills and fever.   HENT:  Negative for rhinorrhea and sore throat.    Eyes:  Negative for pain, redness and visual disturbance.   Respiratory:  Negative for cough and shortness of breath.    Cardiovascular:  Negative for chest pain and leg swelling.   Gastrointestinal:  Negative for abdominal pain, diarrhea and vomiting.   Endocrine: Negative for polydipsia and polyuria.   Genitourinary:  Negative for dysuria, frequency, hematuria, vaginal bleeding and vaginal discharge.   Musculoskeletal:  Negative for back pain and neck pain.   Skin:  Negative for rash and wound.   Allergic/Immunologic: Negative for immunocompromised state.   Neurological:  Positive for numbness and headaches. Negative for weakness.   Hematological:  Does not bruise/bleed easily.   Psychiatric/Behavioral:  Negative for hallucinations and suicidal ideas.    All other systems reviewed and are negative.      Physical Exam  Physical Exam  Vitals reviewed.   Constitutional:       General: She is not in acute distress.  HENT:      Head: Normocephalic and atraumatic.      Nose: Nose normal.      Mouth/Throat:      Mouth: Mucous membranes are moist.   Eyes:      General:         Right eye: No discharge.         Left eye: No discharge.      Extraocular Movements: Extraocular movements intact.      Conjunctiva/sclera: Conjunctivae normal.      Pupils: Pupils are equal, round, and reactive to light.   Cardiovascular:       Rate and Rhythm: Normal rate and regular rhythm.      Pulses: Normal pulses.      Heart sounds: Normal heart sounds. No murmur heard.     No friction rub. No gallop.   Pulmonary:      Effort: Pulmonary effort is normal. No respiratory distress.      Breath sounds: Normal breath sounds. No stridor. No wheezing, rhonchi or rales.   Abdominal:      General: Bowel sounds are normal. There is no distension.      Palpations: Abdomen is soft.      Tenderness: There is no abdominal tenderness. There is no right CVA tenderness, left CVA tenderness, guarding or rebound.   Musculoskeletal:         General: No swelling, tenderness, deformity or signs of injury. Normal range of motion.      Cervical back: Normal range of motion and neck supple. No rigidity.      Right lower leg: No edema.      Left lower leg: No edema.      Comments: No calf tenderness or unilateral leg swelling.   Skin:     General: Skin is warm and dry.      Coloration: Skin is not jaundiced.      Findings: No rash.   Neurological:      General: No focal deficit present.      Mental Status: She is alert and oriented to person, place, and time.      GCS: GCS eye subscore is 4. GCS verbal subscore is 5. GCS motor subscore is 6.      Cranial Nerves: Cranial nerves 2-12 are intact.      Sensory: Sensation is intact. No sensory deficit.      Motor: Motor function is intact. No weakness.      Coordination: Coordination is intact. Finger-Nose-Finger Test normal.   Psychiatric:         Mood and Affect: Mood normal.         Behavior: Behavior normal.         Vital Signs  ED Triage Vitals   Temperature Pulse Respirations Blood Pressure SpO2   05/07/24 1645 05/07/24 1646 05/07/24 1646 05/07/24 1646 05/07/24 1646   98 °F (36.7 °C) (!) 125 18 (!) 163/113 97 %      Temp Source Heart Rate Source Patient Position - Orthostatic VS BP Location FiO2 (%)   05/07/24 1645 05/07/24 1646 05/07/24 1646 05/07/24 1646 --   Oral Monitor Sitting Right arm       Pain Score       05/07/24  1646       7           Vitals:    05/07/24 1646 05/07/24 1843   BP: (!) 163/113 (!) 143/104   Pulse: (!) 125 85   Patient Position - Orthostatic VS: Sitting Sitting         Visual Acuity  Visual Acuity      Flowsheet Row Most Recent Value   L Pupil Size (mm) 3   R Pupil Size (mm) 3            ED Medications  Medications   metoclopramide (REGLAN) injection 10 mg (10 mg Intravenous Given 5/7/24 1744)   ketorolac (TORADOL) injection 30 mg (30 mg Intravenous Given 5/7/24 1741)   magnesium sulfate 2 g/50 mL IVPB (premix) 2 g (0 g Intravenous Stopped 5/7/24 1810)   dexamethasone (PF) (DECADRON) injection 10 mg (10 mg Intravenous Given 5/7/24 1743)       Diagnostic Studies  Results Reviewed       None                   No orders to display              Procedures  Procedures         ED Course                               SBIRT 20yo+      Flowsheet Row Most Recent Value   Initial Alcohol Screen: US AUDIT-C     1. How often do you have a drink containing alcohol? 0 Filed at: 05/07/2024 1747   2. How many drinks containing alcohol do you have on a typical day you are drinking?  0 Filed at: 05/07/2024 1747   3b. FEMALE Any Age, or MALE 65+: How often do you have 4 or more drinks on one occassion? 0 Filed at: 05/07/2024 1747   Audit-C Score 0 Filed at: 05/07/2024 1747   AGNIESZKA: How many times in the past year have you...    Used an illegal drug or used a prescription medication for non-medical reasons? Never Filed at: 05/07/2024 1747                      Medical Decision Making  Headache resolved with ED treatment.  This is not subarachnoid hemorrhage.  It does not sudden onset of maximal headache.  Temporal arteritis would be unlikely.  Patient has no history of autoimmune disorder.  She has known migraine history.  No meningeal signs or fever to suggest meningitis.  Neurologic exam is normal.  Appropriate for discharge and outpatient management.    Risk  Prescription drug management.  Decision regarding  hospitalization.             Disposition  Final diagnoses:   Migraine headache     Time reflects when diagnosis was documented in both MDM as applicable and the Disposition within this note       Time User Action Codes Description Comment    5/7/2024  7:02 PM London Green Add [G43.909] Migraine headache           ED Disposition       ED Disposition   Discharge    Condition   Stable    Date/Time   Tue May 7, 2024 1902    Comment   Herminia Selmacherry De Paz discharge to home/self care.                   Follow-up Information       Follow up With Specialties Details Why Contact Info    Follow-up with your neurologist within 1 week for reevaluation                Patient's Medications   Discharge Prescriptions    BUTALBITAL-APAP-CAFFEINE (FIORICET) -40 MG CAPS    Take 2 tablets by mouth every 4 (four) hours as needed (max 8qd) for up to 10 days       Start Date: 5/7/2024  End Date: 5/17/2024       Order Dose: 2 tablets       Quantity: 20 capsule    Refills: 0       No discharge procedures on file.    PDMP Review       None            ED Provider  Electronically Signed by             London Green MD  05/07/24 9566

## 2024-05-10 DIAGNOSIS — E53.9 VITAMIN B DEFICIENCY: ICD-10-CM

## 2024-05-10 RX ORDER — BUTALBITAL, ACETAMINOPHEN AND CAFFEINE 300; 40; 50 MG/1; MG/1; MG/1
2 CAPSULE ORAL EVERY 4 HOURS PRN
Qty: 20 CAPSULE | Refills: 0 | Status: SHIPPED | OUTPATIENT
Start: 2024-05-10

## 2024-05-10 NOTE — TELEPHONE ENCOUNTER
Reason for call:   [x] Refill   [] Prior Auth  [] Other:     Office:   [x] PCP/Provider - [facility]  [] Specialty/Provider -     Medication:     Does the patient have enough for 3 days?   [] Yes   [x] No - Send as HP to POD

## 2024-05-15 ENCOUNTER — TELEPHONE (OUTPATIENT)
Age: 59
End: 2024-05-15

## 2024-05-15 NOTE — TELEPHONE ENCOUNTER
Patient called in and stated that he pharmacy would fill the Fioricet that was prescribed for her when she went to the ER on 5/7/2024. The pharmacy told her that the primary doctor has to prescribe the Fioricet and it will need a prior auth.    Patient is requesting a call back to see wht can be done as she is having migraines. Please call patient back at .    Thank you          BUTALBITAL-APAP-CAFFEINE (FIORICET) -40 MG CAPS    Take 2 tablets by mouth every 4 (four) hours as needed (max 8qd) for up to 10 days

## 2024-05-16 DIAGNOSIS — G43.009 MIGRAINE WITHOUT AURA AND WITHOUT STATUS MIGRAINOSUS, NOT INTRACTABLE: Primary | ICD-10-CM

## 2024-05-16 RX ORDER — KETOROLAC TROMETHAMINE 10 MG/1
10 TABLET, FILM COATED ORAL EVERY 6 HOURS PRN
Qty: 20 TABLET | Refills: 0 | Status: SHIPPED | OUTPATIENT
Start: 2024-05-16

## 2024-05-23 ENCOUNTER — TELEPHONE (OUTPATIENT)
Age: 59
End: 2024-05-23

## 2024-05-23 NOTE — TELEPHONE ENCOUNTER
PA for ketorolac  10mg    Submitted via    []CMM-KEY   []Surescripts-Case ID #   []Faxed to plan   [x]Other website Martha gutierrez Prior Auth (EOC) ID:  [id number]  []Phone call Case ID #     Office notes sent, clinical questions answered. Awaiting determination    Turnaround time for your insurance to make a decision on your Prior Authorization can take 7-21 business days.

## 2024-05-29 DIAGNOSIS — E55.9 VITAMIN D DEFICIENCY: ICD-10-CM

## 2024-05-29 DIAGNOSIS — E53.9 VITAMIN B DEFICIENCY: ICD-10-CM

## 2024-05-29 DIAGNOSIS — G43.009 MIGRAINE WITHOUT AURA AND WITHOUT STATUS MIGRAINOSUS, NOT INTRACTABLE: ICD-10-CM

## 2024-05-29 NOTE — TELEPHONE ENCOUNTER
vitamin B-12 (CYANOCOBALAMIN) 250 MCG tablet Take 4 tablets (1,000 mcg total) by mouth daily     topiramate (TOPAMAX) 25 mg tablet TAKE 1 TABLET BY MOUTH 2X DAILY @8AM-8PM (MIGRAINE) BIMAL       sertraline (ZOLOFT) 50 mg tablet     sertraline (ZOLOFT) 100 mg tablet Take 100 mg by mouth daily     ketorolac (TORADOL) 10 mg tablet Take 1 tablet (10 mg total) by mouth every 6 (six) hours as needed for moderate pain     cholecalciferol (VITAMIN D3) 1,000 units tablet Take 1 tablet (1,000 Units total) by mouth daily         busPIRone (BUSPAR) 30 MG tablet       All to [facility]

## 2024-05-31 RX ORDER — KETOROLAC TROMETHAMINE 10 MG/1
10 TABLET, FILM COATED ORAL EVERY 6 HOURS PRN
Qty: 20 TABLET | Refills: 3 | Status: SHIPPED | OUTPATIENT
Start: 2024-05-31

## 2024-05-31 RX ORDER — MELATONIN
1000 DAILY
Qty: 90 TABLET | Refills: 1 | Status: SHIPPED | OUTPATIENT
Start: 2024-05-31

## 2024-06-02 RX ORDER — BUSPIRONE HYDROCHLORIDE 30 MG/1
TABLET ORAL
OUTPATIENT
Start: 2024-06-02

## 2024-06-02 RX ORDER — TOPIRAMATE 25 MG/1
TABLET ORAL
Qty: 60 TABLET | Refills: 1 | OUTPATIENT
Start: 2024-06-02

## 2024-06-02 RX ORDER — SERTRALINE HYDROCHLORIDE 100 MG/1
100 TABLET, FILM COATED ORAL DAILY
OUTPATIENT
Start: 2024-06-02

## 2024-06-03 NOTE — TELEPHONE ENCOUNTER
I spoke to patient.  She is getting her psychotropic meds through her psychiatrist so she is taken care of.

## 2024-07-22 ENCOUNTER — HOSPITAL ENCOUNTER (EMERGENCY)
Facility: HOSPITAL | Age: 59
End: 2024-07-22
Attending: EMERGENCY MEDICINE

## 2024-07-22 ENCOUNTER — HOSPITAL ENCOUNTER (INPATIENT)
Facility: HOSPITAL | Age: 59
LOS: 4 days | Discharge: HOME | DRG: 753 | End: 2024-07-26
Attending: PSYCHIATRY & NEUROLOGY | Admitting: PSYCHIATRY & NEUROLOGY
Payer: COMMERCIAL

## 2024-07-22 VITALS
HEART RATE: 118 BPM | TEMPERATURE: 98.2 F | OXYGEN SATURATION: 98 % | DIASTOLIC BLOOD PRESSURE: 85 MMHG | SYSTOLIC BLOOD PRESSURE: 152 MMHG | RESPIRATION RATE: 17 BRPM

## 2024-07-22 DIAGNOSIS — F41.9 ANXIETY: ICD-10-CM

## 2024-07-22 DIAGNOSIS — D50.9 IRON DEFICIENCY ANEMIA, UNSPECIFIED IRON DEFICIENCY ANEMIA TYPE: ICD-10-CM

## 2024-07-22 DIAGNOSIS — Z00.8 MEDICAL CLEARANCE FOR PSYCHIATRIC ADMISSION: ICD-10-CM

## 2024-07-22 DIAGNOSIS — E53.9 VITAMIN B DEFICIENCY: ICD-10-CM

## 2024-07-22 DIAGNOSIS — R45.851 SUICIDAL IDEATIONS: Primary | ICD-10-CM

## 2024-07-22 DIAGNOSIS — F31.9 BIPOLAR AFFECTIVE DISORDER (HCC): Primary | ICD-10-CM

## 2024-07-22 DIAGNOSIS — F33.1 MODERATE EPISODE OF RECURRENT MAJOR DEPRESSIVE DISORDER (HCC): ICD-10-CM

## 2024-07-22 DIAGNOSIS — R45.851 SUICIDAL IDEATIONS: ICD-10-CM

## 2024-07-22 DIAGNOSIS — R03.0 ELEVATED BLOOD PRESSURE READING: ICD-10-CM

## 2024-07-22 DIAGNOSIS — E55.9 VITAMIN D DEFICIENCY: ICD-10-CM

## 2024-07-22 LAB
ALBUMIN SERPL BCG-MCNC: 4.5 G/DL (ref 3.5–5)
ALP SERPL-CCNC: 99 U/L (ref 34–104)
ALT SERPL W P-5'-P-CCNC: 16 U/L (ref 7–52)
AMPHETAMINES SERPL QL SCN: POSITIVE
ANION GAP SERPL CALCULATED.3IONS-SCNC: 11 MMOL/L (ref 4–13)
AST SERPL W P-5'-P-CCNC: 23 U/L (ref 13–39)
ATRIAL RATE: 99 BPM
BACTERIA UR QL AUTO: ABNORMAL /HPF
BARBITURATES UR QL: NEGATIVE
BASOPHILS # BLD AUTO: 0.08 THOUSANDS/ÂΜL (ref 0–0.1)
BASOPHILS NFR BLD AUTO: 1 % (ref 0–1)
BENZODIAZ UR QL: NEGATIVE
BILIRUB SERPL-MCNC: 0.46 MG/DL (ref 0.2–1)
BILIRUB UR QL STRIP: NEGATIVE
BUN SERPL-MCNC: 13 MG/DL (ref 5–25)
CALCIUM SERPL-MCNC: 10.3 MG/DL (ref 8.4–10.2)
CHLORIDE SERPL-SCNC: 105 MMOL/L (ref 96–108)
CLARITY UR: CLEAR
CO2 SERPL-SCNC: 21 MMOL/L (ref 21–32)
COCAINE UR QL: NEGATIVE
COLOR UR: YELLOW
CREAT SERPL-MCNC: 0.73 MG/DL (ref 0.6–1.3)
EOSINOPHIL # BLD AUTO: 0.13 THOUSAND/ÂΜL (ref 0–0.61)
EOSINOPHIL NFR BLD AUTO: 2 % (ref 0–6)
ERYTHROCYTE [DISTWIDTH] IN BLOOD BY AUTOMATED COUNT: 13 % (ref 11.6–15.1)
FENTANYL UR QL SCN: NEGATIVE
GFR SERPL CREATININE-BSD FRML MDRD: 90 ML/MIN/1.73SQ M
GLUCOSE SERPL-MCNC: 121 MG/DL (ref 65–140)
GLUCOSE UR STRIP-MCNC: NEGATIVE MG/DL
HCT VFR BLD AUTO: 45.1 % (ref 34.8–46.1)
HGB BLD-MCNC: 14.2 G/DL (ref 11.5–15.4)
HGB UR QL STRIP.AUTO: ABNORMAL
HYALINE CASTS #/AREA URNS LPF: ABNORMAL /LPF
HYDROCODONE UR QL SCN: NEGATIVE
IMM GRANULOCYTES # BLD AUTO: 0.03 THOUSAND/UL (ref 0–0.2)
IMM GRANULOCYTES NFR BLD AUTO: 0 % (ref 0–2)
KETONES UR STRIP-MCNC: NEGATIVE MG/DL
LEUKOCYTE ESTERASE UR QL STRIP: ABNORMAL
LYMPHOCYTES # BLD AUTO: 1.43 THOUSANDS/ÂΜL (ref 0.6–4.47)
LYMPHOCYTES NFR BLD AUTO: 18 % (ref 14–44)
MCH RBC QN AUTO: 30.1 PG (ref 26.8–34.3)
MCHC RBC AUTO-ENTMCNC: 31.5 G/DL (ref 31.4–37.4)
MCV RBC AUTO: 96 FL (ref 82–98)
METHADONE UR QL: NEGATIVE
MONOCYTES # BLD AUTO: 0.4 THOUSAND/ÂΜL (ref 0.17–1.22)
MONOCYTES NFR BLD AUTO: 5 % (ref 4–12)
MUCOUS THREADS UR QL AUTO: ABNORMAL
NEUTROPHILS # BLD AUTO: 5.8 THOUSANDS/ÂΜL (ref 1.85–7.62)
NEUTS SEG NFR BLD AUTO: 74 % (ref 43–75)
NITRITE UR QL STRIP: NEGATIVE
NON-SQ EPI CELLS URNS QL MICRO: ABNORMAL /HPF
NRBC BLD AUTO-RTO: 0 /100 WBCS
OPIATES UR QL SCN: NEGATIVE
OXYCODONE+OXYMORPHONE UR QL SCN: NEGATIVE
P AXIS: 74 DEGREES
PCP UR QL: NEGATIVE
PH UR STRIP.AUTO: 6 [PH] (ref 4.5–8)
PLATELET # BLD AUTO: 315 THOUSANDS/UL (ref 149–390)
PMV BLD AUTO: 8.9 FL (ref 8.9–12.7)
POTASSIUM SERPL-SCNC: 4.1 MMOL/L (ref 3.5–5.3)
PR INTERVAL: 130 MS
PROT SERPL-MCNC: 8 G/DL (ref 6.4–8.4)
PROT UR STRIP-MCNC: NEGATIVE MG/DL
QRS AXIS: 62 DEGREES
QRSD INTERVAL: 76 MS
QT INTERVAL: 336 MS
QTC INTERVAL: 431 MS
RBC # BLD AUTO: 4.71 MILLION/UL (ref 3.81–5.12)
RBC #/AREA URNS AUTO: ABNORMAL /HPF
SODIUM SERPL-SCNC: 137 MMOL/L (ref 135–147)
SP GR UR STRIP.AUTO: >=1.03 (ref 1–1.03)
T WAVE AXIS: 82 DEGREES
T4 FREE SERPL-MCNC: 0.63 NG/DL (ref 0.61–1.12)
THC UR QL: NEGATIVE
TSH SERPL DL<=0.05 MIU/L-ACNC: 6.48 UIU/ML (ref 0.45–4.5)
TSH SERPL DL<=0.05 MIU/L-ACNC: 6.96 UIU/ML (ref 0.45–4.5)
UROBILINOGEN UR QL STRIP.AUTO: 0.2 E.U./DL
VENTRICULAR RATE: 99 BPM
WBC # BLD AUTO: 7.87 THOUSAND/UL (ref 4.31–10.16)
WBC #/AREA URNS AUTO: ABNORMAL /HPF

## 2024-07-22 PROCEDURE — 93010 ELECTROCARDIOGRAM REPORT: CPT | Performed by: INTERNAL MEDICINE

## 2024-07-22 PROCEDURE — 84439 ASSAY OF FREE THYROXINE: CPT

## 2024-07-22 PROCEDURE — 93005 ELECTROCARDIOGRAM TRACING: CPT

## 2024-07-22 PROCEDURE — 36415 COLL VENOUS BLD VENIPUNCTURE: CPT | Performed by: EMERGENCY MEDICINE

## 2024-07-22 PROCEDURE — 84443 ASSAY THYROID STIM HORMONE: CPT

## 2024-07-22 PROCEDURE — 81001 URINALYSIS AUTO W/SCOPE: CPT

## 2024-07-22 PROCEDURE — 80053 COMPREHEN METABOLIC PANEL: CPT | Performed by: EMERGENCY MEDICINE

## 2024-07-22 PROCEDURE — 83036 HEMOGLOBIN GLYCOSYLATED A1C: CPT

## 2024-07-22 PROCEDURE — 87086 URINE CULTURE/COLONY COUNT: CPT

## 2024-07-22 PROCEDURE — 85025 COMPLETE CBC W/AUTO DIFF WBC: CPT | Performed by: EMERGENCY MEDICINE

## 2024-07-22 PROCEDURE — 99285 EMERGENCY DEPT VISIT HI MDM: CPT | Performed by: EMERGENCY MEDICINE

## 2024-07-22 PROCEDURE — 84439 ASSAY OF FREE THYROXINE: CPT | Performed by: EMERGENCY MEDICINE

## 2024-07-22 PROCEDURE — 80307 DRUG TEST PRSMV CHEM ANLYZR: CPT | Performed by: EMERGENCY MEDICINE

## 2024-07-22 PROCEDURE — 99284 EMERGENCY DEPT VISIT MOD MDM: CPT

## 2024-07-22 PROCEDURE — 84443 ASSAY THYROID STIM HORMONE: CPT | Performed by: EMERGENCY MEDICINE

## 2024-07-22 RX ORDER — MAGNESIUM HYDROXIDE/ALUMINUM HYDROXICE/SIMETHICONE 120; 1200; 1200 MG/30ML; MG/30ML; MG/30ML
30 SUSPENSION ORAL EVERY 4 HOURS PRN
Status: CANCELLED | OUTPATIENT
Start: 2024-07-22

## 2024-07-22 RX ORDER — BISACODYL 10 MG
10 SUPPOSITORY, RECTAL RECTAL DAILY PRN
Status: DISCONTINUED | OUTPATIENT
Start: 2024-07-22 | End: 2024-07-26 | Stop reason: HOSPADM

## 2024-07-22 RX ORDER — OLANZAPINE 5 MG/1
5 TABLET ORAL
Status: CANCELLED | OUTPATIENT
Start: 2024-07-22

## 2024-07-22 RX ORDER — OLANZAPINE 5 MG/1
2.5 TABLET ORAL
Status: CANCELLED | OUTPATIENT
Start: 2024-07-22

## 2024-07-22 RX ORDER — AMOXICILLIN 250 MG
1 CAPSULE ORAL DAILY PRN
Status: CANCELLED | OUTPATIENT
Start: 2024-07-22

## 2024-07-22 RX ORDER — HYDROXYZINE HYDROCHLORIDE 25 MG/1
25 TABLET, FILM COATED ORAL
Status: DISCONTINUED | OUTPATIENT
Start: 2024-07-22 | End: 2024-07-26 | Stop reason: HOSPADM

## 2024-07-22 RX ORDER — OLANZAPINE 2.5 MG/1
2.5 TABLET, FILM COATED ORAL
Status: DISCONTINUED | OUTPATIENT
Start: 2024-07-22 | End: 2024-07-26 | Stop reason: HOSPADM

## 2024-07-22 RX ORDER — BISACODYL 10 MG
10 SUPPOSITORY, RECTAL RECTAL DAILY PRN
Status: DISCONTINUED | OUTPATIENT
Start: 2024-07-22 | End: 2024-07-22

## 2024-07-22 RX ORDER — LORAZEPAM 1 MG/1
1 TABLET ORAL
Status: DISCONTINUED | OUTPATIENT
Start: 2024-07-22 | End: 2024-07-26 | Stop reason: HOSPADM

## 2024-07-22 RX ORDER — ACETAMINOPHEN 325 MG/1
650 TABLET ORAL EVERY 4 HOURS PRN
Status: DISCONTINUED | OUTPATIENT
Start: 2024-07-22 | End: 2024-07-26 | Stop reason: HOSPADM

## 2024-07-22 RX ORDER — LORAZEPAM 1 MG/1
1 TABLET ORAL
Status: CANCELLED | OUTPATIENT
Start: 2024-07-22

## 2024-07-22 RX ORDER — POLYETHYLENE GLYCOL 3350 17 G/17G
17 POWDER, FOR SOLUTION ORAL DAILY PRN
Status: CANCELLED | OUTPATIENT
Start: 2024-07-22

## 2024-07-22 RX ORDER — OLANZAPINE 10 MG/2ML
5 INJECTION, POWDER, FOR SOLUTION INTRAMUSCULAR
Status: CANCELLED | OUTPATIENT
Start: 2024-07-22

## 2024-07-22 RX ORDER — OLANZAPINE 5 MG/1
5 TABLET ORAL
Status: DISCONTINUED | OUTPATIENT
Start: 2024-07-22 | End: 2024-07-26 | Stop reason: HOSPADM

## 2024-07-22 RX ORDER — ACETAMINOPHEN 325 MG/1
975 TABLET ORAL EVERY 6 HOURS PRN
Status: DISCONTINUED | OUTPATIENT
Start: 2024-07-22 | End: 2024-07-26 | Stop reason: HOSPADM

## 2024-07-22 RX ORDER — BISACODYL 10 MG
10 SUPPOSITORY, RECTAL RECTAL DAILY PRN
Status: CANCELLED | OUTPATIENT
Start: 2024-07-22

## 2024-07-22 RX ORDER — BENZTROPINE MESYLATE 1 MG/ML
1 INJECTION INTRAMUSCULAR; INTRAVENOUS
Status: DISCONTINUED | OUTPATIENT
Start: 2024-07-22 | End: 2024-07-26 | Stop reason: HOSPADM

## 2024-07-22 RX ORDER — HYDROXYZINE HYDROCHLORIDE 25 MG/1
50 TABLET, FILM COATED ORAL
Status: CANCELLED | OUTPATIENT
Start: 2024-07-22

## 2024-07-22 RX ORDER — HYDROXYZINE 50 MG/1
50 TABLET, FILM COATED ORAL
Status: DISCONTINUED | OUTPATIENT
Start: 2024-07-22 | End: 2024-07-26 | Stop reason: HOSPADM

## 2024-07-22 RX ORDER — ACETAMINOPHEN 325 MG/1
650 TABLET ORAL EVERY 4 HOURS PRN
Status: CANCELLED | OUTPATIENT
Start: 2024-07-22

## 2024-07-22 RX ORDER — POLYETHYLENE GLYCOL 3350 17 G/17G
17 POWDER, FOR SOLUTION ORAL DAILY PRN
Status: DISCONTINUED | OUTPATIENT
Start: 2024-07-22 | End: 2024-07-26 | Stop reason: HOSPADM

## 2024-07-22 RX ORDER — TRAZODONE HYDROCHLORIDE 50 MG/1
50 TABLET ORAL
Status: DISCONTINUED | OUTPATIENT
Start: 2024-07-22 | End: 2024-07-26 | Stop reason: HOSPADM

## 2024-07-22 RX ORDER — BENZTROPINE MESYLATE 0.5 MG/1
0.5 TABLET ORAL
Status: DISCONTINUED | OUTPATIENT
Start: 2024-07-22 | End: 2024-07-26 | Stop reason: HOSPADM

## 2024-07-22 RX ORDER — PROPRANOLOL HYDROCHLORIDE 10 MG/1
5 TABLET ORAL EVERY 8 HOURS PRN
Status: CANCELLED | OUTPATIENT
Start: 2024-07-22

## 2024-07-22 RX ORDER — BENZTROPINE MESYLATE 1 MG/ML
1 INJECTION INTRAMUSCULAR; INTRAVENOUS
Status: CANCELLED | OUTPATIENT
Start: 2024-07-22

## 2024-07-22 RX ORDER — OLANZAPINE 10 MG/2ML
5 INJECTION, POWDER, FOR SOLUTION INTRAMUSCULAR
Status: DISCONTINUED | OUTPATIENT
Start: 2024-07-22 | End: 2024-07-26 | Stop reason: HOSPADM

## 2024-07-22 RX ORDER — BENZTROPINE MESYLATE 0.5 MG/1
0.5 TABLET ORAL
Status: CANCELLED | OUTPATIENT
Start: 2024-07-22

## 2024-07-22 RX ORDER — ACETAMINOPHEN 325 MG/1
975 TABLET ORAL EVERY 6 HOURS PRN
Status: CANCELLED | OUTPATIENT
Start: 2024-07-22

## 2024-07-22 RX ORDER — HYDROXYZINE HYDROCHLORIDE 25 MG/1
25 TABLET, FILM COATED ORAL
Status: CANCELLED | OUTPATIENT
Start: 2024-07-22

## 2024-07-22 RX ORDER — LORAZEPAM 2 MG/ML
1 INJECTION INTRAMUSCULAR
Status: CANCELLED | OUTPATIENT
Start: 2024-07-22

## 2024-07-22 RX ORDER — TRAZODONE HYDROCHLORIDE 50 MG/1
50 TABLET ORAL
Status: CANCELLED | OUTPATIENT
Start: 2024-07-22

## 2024-07-22 RX ORDER — LORAZEPAM 2 MG/ML
1 INJECTION INTRAMUSCULAR
Status: DISCONTINUED | OUTPATIENT
Start: 2024-07-22 | End: 2024-07-26 | Stop reason: HOSPADM

## 2024-07-22 RX ORDER — AMOXICILLIN 250 MG
1 CAPSULE ORAL DAILY PRN
Status: DISCONTINUED | OUTPATIENT
Start: 2024-07-22 | End: 2024-07-26 | Stop reason: HOSPADM

## 2024-07-22 RX ORDER — PROPRANOLOL HYDROCHLORIDE 10 MG/1
5 TABLET ORAL EVERY 8 HOURS PRN
Status: DISCONTINUED | OUTPATIENT
Start: 2024-07-22 | End: 2024-07-26 | Stop reason: HOSPADM

## 2024-07-22 RX ORDER — MAGNESIUM HYDROXIDE/ALUMINUM HYDROXICE/SIMETHICONE 120; 1200; 1200 MG/30ML; MG/30ML; MG/30ML
30 SUSPENSION ORAL EVERY 4 HOURS PRN
Status: DISCONTINUED | OUTPATIENT
Start: 2024-07-22 | End: 2024-07-26 | Stop reason: HOSPADM

## 2024-07-22 RX ADMIN — TRAZODONE HYDROCHLORIDE 50 MG: 50 TABLET ORAL at 21:40

## 2024-07-22 NOTE — ED NOTES
"Patient is a 59 year old female presenting to the emergency department with suicidal ideations and depression, She says she ran out of her straterra on Thursday and since then she has been thinking about hurting herself. She says she needs a prior authorization for the medication and is having a hard time obtaining it. She says she doesnt feel safe at home because the depression has been getting worse. She had previous attempts in the past with the last one being in 2021. She lives in a home with another room mate through [facility]. She says her sleep has been poor and she stays up at night and has difficulty falling asleep. She has anxiety and says when she gets a thought she \"runs with it\" and then cannot fall back asleep. She has been hospitalized in the past at [facility] and wants to return there. She does have a history of substance abuse and also says she is on probation however they are working with her to finish it sooner than the expected late of discharge next year. Patient feels she needs inpatient treatment because she is afraid her depression is worsening quickly. She is in agreement with signing a 201 for treatment.   "

## 2024-07-22 NOTE — ED NOTES
Patient is accepted at [facility].  Patient is accepted by Dr. KARIME Gallegos per Arvin in Intake.    Transportation is arranged with [facility].  Transportation is scheduled for TBD.  Patient may go to the floor at 1500.      Nurse report is to be called to [phone] prior to patient transfer.     Luis Shane  Crisis Intervention Specialist II  07/22/24

## 2024-07-22 NOTE — NURSING NOTE
"Pt admitted on 201 from [facility] ED for increased depression and SI without plan. Pt reports difficulty obtaining Strattera due to prior authorization issue after it was changed from 80 mg to 60 mg. Pt reports not taking for four days and leading to increase in depression. Pt also takes Abilify 20 mg, Zoloft 100 mg (pt reports she was previously taking Zoloft 200 mg). Pt was prescribed additional medications but states she was unable to afford them. Pt currently resides at [facility]. Reports hx of meth abuse, reports being in recovery for 2 years. Pt endorses feeling \"hopeless\" and that \"nothing  do is right\" and was concerned that mood was worsening. Reports poor appetite and difficulty falling asleep. Pt reports recently being monitored for possible hypothyroid condition. Pt does report hx of previous SA, requiring hospitalization due to OD on tylenol as teenager. Pt also reports SA by overdose on heroin in past, but she dropped the needle.Hx of self injurous behavior of cutting, pt report she stopped cutting in 2021.  Pt denies current SI. Pt calm and cooperative with admission process.   "

## 2024-07-22 NOTE — ED PROVIDER NOTES
History  Chief Complaint   Patient presents with    Psychiatric Evaluation     States that she has been very depressed lately. Has not had stratterra since thurs. And new script has not come in yet. Denies si/hi/vh/ah     59-year-old female here for psychiatric evaluation.  She states that she has been having worsening depression and thoughts of suicide that are getting more severe over the past few days.  She denies any active suicidal plan but has had previous suicide attempts in the past.  She reports that she is taking her psychiatric medications as prescribed but is worried that they are not working as effectively as they had in the past.  She denies any thoughts of harming others, auditory or visual hallucinations.  Denies fevers or recent illnesses.  She reports normal appetite and p.o. intake, normal urination.  No nausea or vomiting.      Psychiatric Evaluation  Presenting symptoms: suicidal thoughts    Associated symptoms: anxiety    Associated symptoms: no abdominal pain and no chest pain        Prior to Admission Medications   Prescriptions Last Dose Informant Patient Reported? Taking?   ARIPiprazole (ABILIFY) 20 MG tablet   Yes Yes   Sig: Take 20 mg by mouth daily   Butalbital-APAP-Caffeine (Fioricet) -40 MG CAPS   No No   Sig: Take 2 tablets by mouth every 4 (four) hours as needed (max 8qd) for up to 20 doses   Multiple Vitamin (Multivitamin Adult) TABS   No No   Sig: Take 1 pill daily   albuterol (PROVENTIL HFA,VENTOLIN HFA) 90 mcg/act inhaler   Yes No   atomoxetine (STRATTERA) 80 MG capsule   Yes Yes   Sig: Take 60 mg by mouth daily PT REPORTS DOSE IS DOWN TO 60mg   budesonide-formoterol (SYMBICORT) 160-4.5 mcg/act inhaler   Yes No   Sig: Inhale 2 puffs 2 (two) times a day Rinse mouth after use.   budesonide-formoterol (Symbicort) 160-4.5 mcg/act inhaler   No No   Sig: Inhale 2 puffs 2 (two) times a day At 8AM and HS.Rinse mouth after use.   busPIRone (BUSPAR) 15 mg tablet   Yes No   busPIRone  (BUSPAR) 30 MG tablet   Yes No   cholecalciferol (VITAMIN D3) 1,000 units tablet   No No   Sig: Take 1 tablet (1,000 Units total) by mouth daily   ketorolac (TORADOL) 10 mg tablet   No No   Sig: Take 1 tablet (10 mg total) by mouth every 6 (six) hours as needed for moderate pain   nicotine polacrilex (NICORETTE) 4 mg gum   No No   Sig: Chew 1 each (4 mg total) as needed for smoking cessation   sertraline (ZOLOFT) 100 mg tablet  Self Yes Yes   Sig: Take 100 mg by mouth daily   sertraline (ZOLOFT) 50 mg tablet   Yes No   topiramate (TOPAMAX) 25 mg tablet   No No   Sig: TAKE 1 TABLET BY MOUTH 2X DAILY @8AM-8PM (MIGRAINE) BIMAL   traZODone (DESYREL) 50 mg tablet   Yes No   vitamin B-12 (CYANOCOBALAMIN) 250 MCG tablet   No No   Sig: Take 4 tablets (1,000 mcg total) by mouth daily      Facility-Administered Medications: None       Past Medical History:   Diagnosis Date    ADHD (attention deficit hyperactivity disorder)     Allergic     Anemia     Anxiety     COPD (chronic obstructive pulmonary disease) (Formerly [facility])     Depression     Headache(784.0) 1980    Hemangioma     Hypertension     Panic attack     Panic disorder     Psychiatric illness     PTSD (post-traumatic stress disorder)     Self-injurious behavior     Sleep difficulties     Substance abuse (Formerly [facility])     Substance abuse (Formerly [facility]) 08/15/2023    Suicide attempt (Formerly [facility])     Visual impairment        Past Surgical History:   Procedure Laterality Date     SECTION       SECTION         Family History   Problem Relation Age of Onset    Cancer Mother     Autoimmune disease Mother     COPD Mother     Depression Mother     Dementia Mother     Alzheimer's disease Mother     Alzheimer's disease Father     Dementia Father     No Known Problems Sister     No Known Problems Brother     Depression Daughter     No Known Problems Maternal Grandmother     No Known Problems Maternal Grandfather     No Known Problems Paternal Grandmother     No Known Problems Paternal Grandfather      No Known Problems Maternal Aunt     No Known Problems Maternal Uncle     No Known Problems Paternal Aunt     No Known Problems Paternal Uncle     No Known Problems Cousin     ADD / ADHD Neg Hx     Alcohol abuse Neg Hx     Anxiety disorder Neg Hx     Bipolar disorder Neg Hx     Completed Suicide  Neg Hx     Drug abuse Neg Hx     OCD Neg Hx     Psychiatric Illness Neg Hx     Psychosis Neg Hx     Schizoaffective Disorder  Neg Hx     Schizophrenia Neg Hx     Self-Injury Neg Hx     Suicide Attempts Neg Hx      I have reviewed and agree with the history as documented.    E-Cigarette/Vaping    E-Cigarette Use Current Some Day User      E-Cigarette/Vaping Substances    Nicotine Yes     Flavoring Yes      Social History     Tobacco Use    Smoking status: Every Day     Current packs/day: 0.50     Average packs/day: 0.5 packs/day for 35.0 years (17.5 ttl pk-yrs)     Types: Cigarettes    Smokeless tobacco: Never    Tobacco comments:     declines   Vaping Use    Vaping status: Some Days    Substances: Nicotine, Flavoring   Substance Use Topics    Alcohol use: Not Currently    Drug use: Not Currently     Types: Amphetamines, Methamphetamines     Comment: denies, reports positive uds is from allegra       Review of Systems   Constitutional:  Negative for chills and fever.   Eyes:  Negative for visual disturbance.   Respiratory:  Negative for cough and shortness of breath.    Cardiovascular:  Negative for chest pain and palpitations.   Gastrointestinal:  Negative for abdominal pain, nausea and vomiting.   Genitourinary:  Negative for dysuria and hematuria.   Musculoskeletal:  Negative for arthralgias and back pain.   Skin:  Negative for color change and rash.   Neurological:  Negative for syncope.   Psychiatric/Behavioral:  Positive for dysphoric mood and suicidal ideas. The patient is nervous/anxious.    All other systems reviewed and are negative.      Physical Exam  Physical Exam  Vitals and nursing note reviewed.    Constitutional:       General: She is not in acute distress.     Appearance: She is well-developed.   HENT:      Head: Normocephalic and atraumatic.   Eyes:      Conjunctiva/sclera: Conjunctivae normal.   Cardiovascular:      Rate and Rhythm: Normal rate and regular rhythm.      Heart sounds: No murmur heard.  Pulmonary:      Effort: Pulmonary effort is normal. No respiratory distress.      Breath sounds: Normal breath sounds.   Abdominal:      Palpations: Abdomen is soft.      Tenderness: There is no abdominal tenderness.   Musculoskeletal:         General: No swelling.      Cervical back: Neck supple.   Skin:     General: Skin is warm and dry.      Capillary Refill: Capillary refill takes less than 2 seconds.   Neurological:      General: No focal deficit present.      Mental Status: She is alert and oriented to person, place, and time.   Psychiatric:      Comments: Admits to suicidal ideation without plan, no HI, no AH/VH.         Vital Signs  ED Triage Vitals   Temperature Pulse Respirations Blood Pressure SpO2   07/22/24 0946 07/22/24 0946 07/22/24 0946 07/22/24 0946 07/22/24 0946   98.2 °F (36.8 °C) 104 17 160/100 99 %      Temp Source Heart Rate Source Patient Position - Orthostatic VS BP Location FiO2 (%)   07/22/24 0946 07/22/24 0946 07/22/24 1600 07/22/24 0946 --   Oral Monitor Sitting Right arm       Pain Score       07/22/24 1600       No Pain           Vitals:    07/22/24 0946 07/22/24 1600   BP: 160/100 152/85   Pulse: 104 (!) 118   Patient Position - Orthostatic VS:  Sitting         Visual Acuity      ED Medications  Medications - No data to display    Diagnostic Studies  Results Reviewed       Procedure Component Value Units Date/Time    Urine Microscopic [[id number]]  (Abnormal) Collected: 07/22/24 1223    Lab Status: Final result Specimen: Urine, Clean Catch Updated: 07/22/24 1301     RBC, UA 4-10 /hpf      WBC, UA 10-20 /hpf      Epithelial Cells Occasional /hpf      Bacteria, UA Occasional /hpf       MUCUS THREADS Innumerable     Hyaline Casts, UA 0-3 /lpf     Urine culture [[id number]] Collected: 07/22/24 1223    Lab Status: In process Specimen: Urine, Clean Catch Updated: 07/22/24 1301    Rapid drug screen, urine [[id number]]  (Abnormal) Collected: 07/22/24 1222    Lab Status: Final result Specimen: Urine, Clean Catch Updated: 07/22/24 1255     Amph/Meth UR Positive     Barbiturate Ur Negative     Benzodiazepine Urine Negative     Cocaine Urine Negative     Methadone Urine Negative     Opiate Urine Negative     PCP Ur Negative     THC Urine Negative     Oxycodone Urine Negative     Fentanyl Urine Negative     HYDROCODONE URINE Negative    Narrative:      FOR MEDICAL PURPOSES ONLY.   IF CONFIRMATION NEEDED PLEASE CONTACT THE LAB WITHIN 5 DAYS.    Drug Screen Cutoff Levels:  AMPHETAMINE/METHAMPHETAMINES  1000 ng/mL  BARBITURATES     200 ng/mL  BENZODIAZEPINES     200 ng/mL  COCAINE      300 ng/mL  METHADONE      300 ng/mL  OPIATES      300 ng/mL  PHENCYCLIDINE     25 ng/mL  THC       50 ng/mL  OXYCODONE      100 ng/mL  FENTANYL      5 ng/mL  HYDROCODONE     300 ng/mL    Urine Macroscopic, POC [[id number]]  (Abnormal) Collected: 07/22/24 1223    Lab Status: Final result Specimen: Urine Updated: 07/22/24 1225     Color, UA Yellow     Clarity, UA Clear     pH, UA 6.0     Leukocytes, UA Small     Nitrite, UA Negative     Protein, UA Negative mg/dl      Glucose, UA Negative mg/dl      Ketones, UA Negative mg/dl      Urobilinogen, UA 0.2 E.U./dl      Bilirubin, UA Negative     Occult Blood, UA Trace     Specific Gravity, UA >=1.030    Narrative:      CLINITEK RESULT    Comprehensive metabolic panel [[id number]]  (Abnormal) Collected: 07/22/24 1031    Lab Status: Final result Specimen: Blood from Arm, Right Updated: 07/22/24 1117     Sodium 137 mmol/L      Potassium 4.1 mmol/L      Chloride 105 mmol/L      CO2 21 mmol/L      ANION GAP 11 mmol/L      BUN 13 mg/dL      Creatinine 0.73 mg/dL      Glucose 121 mg/dL       Calcium 10.3 mg/dL      AST 23 U/L      ALT 16 U/L      Alkaline Phosphatase 99 U/L      Total Protein 8.0 g/dL      Albumin 4.5 g/dL      Total Bilirubin 0.46 mg/dL      eGFR 90 ml/min/1.73sq m     Narrative:      National Kidney Disease Foundation guidelines for Chronic Kidney Disease (CKD):     Stage 1 with normal or high GFR (GFR > 90 mL/min/1.73 square meters)    Stage 2 Mild CKD (GFR = 60-89 mL/min/1.73 square meters)    Stage 3A Moderate CKD (GFR = 45-59 mL/min/1.73 square meters)    Stage 3B Moderate CKD (GFR = 30-44 mL/min/1.73 square meters)    Stage 4 Severe CKD (GFR = 15-29 mL/min/1.73 square meters)    Stage 5 End Stage CKD (GFR <15 mL/min/1.73 square meters)  Note: GFR calculation is accurate only with a steady state creatinine    TSH, 3rd generation with Free T4 reflex [[id number]]  (Abnormal) Collected: 07/22/24 1031    Lab Status: Final result Specimen: Blood from Arm, Right Updated: 07/22/24 1117     TSH 3RD GENERATON 6.962 uIU/mL     T4, free [[id number]] Collected: 07/22/24 1031    Lab Status: In process Specimen: Blood from Arm, Right Updated: 07/22/24 1117    CBC and differential [[id number]] Collected: 07/22/24 1031    Lab Status: Final result Specimen: Blood from Arm, Right Updated: 07/22/24 1040     WBC 7.87 Thousand/uL      RBC 4.71 Million/uL      Hemoglobin 14.2 g/dL      Hematocrit 45.1 %      MCV 96 fL      MCH 30.1 pg      MCHC 31.5 g/dL      RDW 13.0 %      MPV 8.9 fL      Platelets 315 Thousands/uL      nRBC 0 /100 WBCs      Segmented % 74 %      Immature Grans % 0 %      Lymphocytes % 18 %      Monocytes % 5 %      Eosinophils Relative 2 %      Basophils Relative 1 %      Absolute Neutrophils 5.80 Thousands/µL      Absolute Immature Grans 0.03 Thousand/uL      Absolute Lymphocytes 1.43 Thousands/µL      Absolute Monocytes 0.40 Thousand/µL      Eosinophils Absolute 0.13 Thousand/µL      Basophils Absolute 0.08 Thousands/µL                    No orders to display               Procedures  Procedures         ED Course                                               Medical Decision Making  59-year-old female here for psychiatric evaluation.  She was medically cleared, seen and evaluated by the emergency department crisis worker and signed 201 voluntary inpatient psychiatric treatment paperwork.  We will plan to hold her here in the emergency department so she can be placed in an appropriate inpatient psychiatric facility.    Amount and/or Complexity of Data Reviewed  Labs: ordered. Decision-making details documented in ED Course.    Risk  Decision regarding hospitalization.                 Disposition  Final diagnoses:   Suicidal ideations     Time reflects when diagnosis was documented in both MDM as applicable and the Disposition within this note       Time User Action Codes Description Comment    7/22/2024 10:27 AM Abner Bain [R45.851] Suicidal ideations     7/22/2024  2:24 PM Gely Villanueva [Z00.8] Medical clearance for psychiatric admission     7/22/2024  2:24 PM Gely Villanueva [R03.0] Elevated blood pressure reading     7/22/2024  2:24 PM Gely Villanueva [J40] Bronchitis     7/22/2024  2:24 PM Gely Villanueva [J40] Bronchitis     7/22/2024  2:24 PM Gely Villanueva [D50.9] Iron deficiency anemia, unspecified iron deficiency anemia type           ED Disposition       ED Disposition   Transfer to Behavioral Health Condition   --    Date/Time   Mon Jul 22, 2024 10:27 AM    Comment   Herminia De Paz should be transferred out to  and has been medically cleared.               MD Documentation      Flowsheet Row Most Recent Value   Patient Condition The patient has been stabilized such that within reasonable medical probability, no material deterioration of the patient condition or the condition of the unborn child(colt) is likely to result from the transfer   Reason for Transfer Level of Care needed not available at this facility   Benefits of Transfer  Specialized equipment and/or services available at the receiving facility (Include comment)________________________  [psych]   Risks of Transfer Potential for delay in receiving treatment   Accepting Physician Dr. KARIME Gallegos   Accepting Facility Name, [address] [facility]    (Name & Tel number) [facility] [phone]   Transported by (Company and Unit #) [employer]   Sending MD Bain   Provider Certification General risk, such as traffic hazards, adverse weather conditions, rough terrain or turbulence, possible failure of equipment (including vehicle or aircraft), or consequences of actions of persons outside the control of the transport personnel          RN Documentation      Flowsheet Row Most Recent Value   Accepting Facility Name, [address] [facility]    (Name & Tel number) [facility] [phone]   Transported by (Company and Unit #) [employer]          Follow-up Information    None         Patient's Medications   Discharge Prescriptions    No medications on file       No discharge procedures on file.    PDMP Review         Value Time User    PDMP Reviewed  Yes 7/22/2024  2:16 PM GEOVANNA Vila            ED Provider  Electronically Signed by             Abner Bain MD  07/22/24 1378

## 2024-07-22 NOTE — EMTALA/ACUTE CARE TRANSFER
[facility] EMERGENCY DEPARTMENT  [address] [address]  Dept: [phone]      EMTALA TRANSFER CONSENT    NAME Herminia De Paz                                         1965                              MRN [id number]    I have been informed of my rights regarding examination, treatment, and transfer   by Dr. Abner Bain MD    Benefits: Specialized equipment and/or services available at the receiving facility (Include comment)________________________ (psych)    Risks: Potential for delay in receiving treatment      Consent for Transfer:  I acknowledge that my medical condition has been evaluated and explained to me by the emergency department physician or other qualified medical person and/or my attending physician, who has recommended that I be transferred to the service of  Accepting Physician: Dr. KARIME Gallegos at Accepting Facility Name, City & State : [facility]. The above potential benefits of such transfer, the potential risks associated with such transfer, and the probable risks of not being transferred have been explained to me, and I fully understand them.  The doctor has explained that, in my case, the benefits of transfer outweigh the risks.  I agree to be transferred.    I authorize the performance of emergency medical procedures and treatments upon me in both transit and upon arrival at the receiving facility.  Additionally, I authorize the release of any and all medical records to the receiving facility and request they be transported with me, if possible.  I understand that the safest mode of transportation during a medical emergency is an ambulance and that the Hospital advocates the use of this mode of transport. Risks of traveling to the receiving facility by car, including absence of medical control, life sustaining equipment, such as oxygen, and medical personnel has been explained to me and I fully understand them.    (LORI  CORRECT BOX BELOW)  [  ]  I consent to the stated transfer and to be transported by ambulance/helicopter.  [  ]  I consent to the stated transfer, but refuse transportation by ambulance and accept full responsibility for my transportation by car.  I understand the risks of non-ambulance transfers and I exonerate the Hospital and its staff from any deterioration in my condition that results from this refusal.    X___________________________________________    DATE  24  TIME________  Signature of patient or legally responsible individual signing on patient behalf           RELATIONSHIP TO PATIENT_________________________          Provider Certification    NAME Herminia De Paz                                         1965                              MRN [id number]    A medical screening exam was performed on the above named patient.  Based on the examination:    Condition Necessitating Transfer The primary encounter diagnosis was Suicidal ideations. Diagnoses of Medical clearance for psychiatric admission, Elevated blood pressure reading, and Iron deficiency anemia, unspecified iron deficiency anemia type were also pertinent to this visit.    Patient Condition: The patient has been stabilized such that within reasonable medical probability, no material deterioration of the patient condition or the condition of the unborn child(colt) is likely to result from the transfer    Reason for Transfer: Level of Care needed not available at this facility    Transfer Requirements: [address]   Space available and qualified personnel available for treatment as acknowledged by [facility] [phone]  Agreed to accept transfer and to provide appropriate medical treatment as acknowledged by       Dr. KARIME Gallegos  Appropriate medical records of the examination and treatment of the patient are provided at the time of transfer   STAFF INITIAL WHEN COMPLETED _______  Transfer will be performed by  qualified personnel from [facility]  and appropriate transfer equipment as required, including the use of necessary and appropriate life support measures.    Provider Certification: I have examined the patient and explained the following risks and benefits of being transferred/refusing transfer to the patient/family:  General risk, such as traffic hazards, adverse weather conditions, rough terrain or turbulence, possible failure of equipment (including vehicle or aircraft), or consequences of actions of persons outside the control of the transport personnel      Based on these reasonable risks and benefits to the patient and/or the unborn child(colt), and based upon the information available at the time of the patient’s examination, I certify that the medical benefits reasonably to be expected from the provision of appropriate medical treatments at another medical facility outweigh the increasing risks, if any, to the individual’s medical condition, and in the case of labor to the unborn child, from effecting the transfer.    X____________________________________________ DATE 07/22/24        TIME_______      ORIGINAL - SEND TO MEDICAL RECORDS   COPY - SEND WITH PATIENT DURING TRANSFER

## 2024-07-22 NOTE — ED NOTES
Pt belongings placed in  21 locker due to reserve locker being occupied. RN made aware.     Pt necklace also cannot be remove due to knot in metal.       Nestor Hilario  07/22/24 2866

## 2024-07-22 NOTE — PLAN OF CARE
Problem: Alteration in Thoughts and Perception  Goal: Treatment Goal: Gain control of psychotic behaviors/thinking, reduce/eliminate presenting symptoms and demonstrate improved reality functioning upon discharge  Outcome: Progressing  Goal: Agree to be compliant with medication regime, as prescribed and report medication side effects  Description: Interventions:  - Offer appropriate PRN medication and supervise ingestion; conduct AIMS, as needed   Outcome: Progressing     Problem: Ineffective Coping  Goal: Cooperates with admission process  Description: Interventions:   - Complete admission process  Outcome: Progressing  Goal: Identifies healthy coping skills  Outcome: Progressing  Goal: Patient/Family participate in treatment and DC plans  Description: Interventions:  - Provide therapeutic environment  Outcome: Progressing  Goal: Patient/Family verbalizes awareness of resources  Outcome: Progressing  Goal: Understands least restrictive measures  Description: Interventions:  - Utilize least restrictive behavior  Outcome: Progressing  Goal: Free from restraint events  Description: - Utilize least restrictive measures   - Provide behavioral interventions   - Redirect inappropriate behaviors   Outcome: Progressing     Problem: Depression  Goal: Treatment Goal: Demonstrate behavioral control of depressive symptoms, verbalize feelings of improved mood/affect, and adopt new coping skills prior to discharge  Outcome: Progressing  Goal: Verbalize thoughts and feelings  Description: Interventions:  - Assess and re-assess patient's level of risk   - Engage patient in 1:1 interactions, daily, for a minimum of 15 minutes   - Encourage patient to express feelings, fears, frustrations, hopes   Outcome: Progressing  Goal: Refrain from harming self  Description: Interventions:  - Monitor patient closely, per order   - Supervise medication ingestion, monitor effects and side effects   Outcome: Progressing  Goal: Refrain from  isolation  Description: Interventions:  - Develop a trusting relationship   - Encourage socialization   Outcome: Progressing  Goal: Refrain from self-neglect  Outcome: Progressing  Goal: Attend and participate in unit activities, including therapeutic, recreational, and educational groups  Description: Interventions:  - Provide therapeutic and educational activities daily, encourage attendance and participation, and document same in the medical record   Outcome: Progressing  Goal: Complete daily ADLs, including personal hygiene independently, as able  Description: Interventions:  - Observe, teach, and assist patient with ADLS  -  Monitor and promote a balance of rest/activity, with adequate nutrition and elimination   Outcome: Progressing     Problem: Anxiety  Goal: Anxiety is at manageable level  Description: Interventions:  - Assess and monitor patient's anxiety level.   - Monitor for signs and symptoms (heart palpitations, chest pain, shortness of breath, headaches, nausea, feeling jumpy, restlessness, irritable, apprehensive).   - Collaborate with interdisciplinary team and initiate plan and interventions as ordered.  - [place] patient to unit/surroundings  - Explain treatment plan  - Encourage participation in care  - Encourage verbalization of concerns/fears  - Identify coping mechanisms  - Assist in developing anxiety-reducing skills  - Administer/offer alternative therapies  - Limit or eliminate stimulants  Outcome: Progressing

## 2024-07-22 NOTE — CASE MANAGEMENT
Case Management ED Discharge Planning Note    Patient name Herminia De Paz  Location ED-28/ED-28 MRN [id number]  : 1965 Date 2024        OBJECTIVE:  Predictive Model Details          46%  Factor Value    Risk of Hospital Admission or ED Visit Model 58% Number of ED Visits 2     18% Is in Relationship No     12% Has Anemia Yes     10% Has Depression Yes     2% Has PCP Yes            Chief Complaint: Encounter for psychological evaluation .  Patient Class: Emergency  Preferred Pharmacy:   [facility]/pharmacy #[id number] - [place], [place] - [address] [address]  Phone: [phone] Fax: [phone]    Primary Care Provider: Andre Mitchell DO    Primary Insurance: [employer]  Secondary Insurance:     ED Discharge Details:                                          Other Referral/Resources/Interventions Provided:  Interventions: Outpatient   Referral Comments: Per ED readmission risk

## 2024-07-22 NOTE — ED NOTES
Insurance Authorization for admission:   Per EVS, patient's MA coverage is fee for service. Patient has a [employer] insurance card on file, however, per Sasha that plan is inactive.    [facility]  Phone number: [phone].     Automated message indicates they are unable to complete precert and that must be done using the phone number on back of patient's ID card, which is unavailable.    Insurance Authorization for Transportation:    Not required for CTS transport.    Sylwia Mckeon LCSW  07/22/24    [id number]

## 2024-07-22 NOTE — ED NOTES
Roundtrip initiated at this time.    CTS P/U [id number]    Luis Shane  Crisis Intervention Specialist II  07/22/24

## 2024-07-23 ENCOUNTER — PATIENT OUTREACH (OUTPATIENT)
Dept: CASE MANAGEMENT | Facility: OTHER | Age: 59
End: 2024-07-23

## 2024-07-23 ENCOUNTER — APPOINTMENT (INPATIENT)
Dept: RADIOLOGY | Facility: HOSPITAL | Age: 59
DRG: 753 | End: 2024-07-23
Payer: COMMERCIAL

## 2024-07-23 DIAGNOSIS — Z78.9 NEED FOR FOLLOW-UP BY SOCIAL WORKER: Primary | ICD-10-CM

## 2024-07-23 PROBLEM — Z72.0 TOBACCO ABUSE: Status: ACTIVE | Noted: 2024-07-23

## 2024-07-23 PROBLEM — E55.9 VITAMIN D DEFICIENCY: Status: ACTIVE | Noted: 2024-07-23

## 2024-07-23 PROBLEM — Z00.8 MEDICAL CLEARANCE FOR PSYCHIATRIC ADMISSION: Status: ACTIVE | Noted: 2024-02-22

## 2024-07-23 PROBLEM — F31.9 BIPOLAR AFFECTIVE DISORDER (HCC): Status: ACTIVE | Noted: 2024-07-23

## 2024-07-23 LAB
25(OH)D3 SERPL-MCNC: 25.5 NG/ML (ref 30–100)
ATRIAL RATE: 76 BPM
ATRIAL RATE: 76 BPM
BACTERIA UR CULT: NORMAL
EST. AVERAGE GLUCOSE BLD GHB EST-MCNC: 126 MG/DL
FOLATE SERPL-MCNC: 7.4 NG/ML
HBA1C MFR BLD: 6 %
P AXIS: 68 DEGREES
P AXIS: 68 DEGREES
PR INTERVAL: 138 MS
PR INTERVAL: 138 MS
QRS AXIS: 68 DEGREES
QRS AXIS: 68 DEGREES
QRSD INTERVAL: 76 MS
QRSD INTERVAL: 76 MS
QT INTERVAL: 366 MS
QT INTERVAL: 366 MS
QTC INTERVAL: 411 MS
QTC INTERVAL: 411 MS
T WAVE AXIS: 70 DEGREES
T WAVE AXIS: 70 DEGREES
T4 FREE SERPL-MCNC: 0.67 NG/DL (ref 0.61–1.12)
VENTRICULAR RATE: 76 BPM
VENTRICULAR RATE: 76 BPM
VIT B12 SERPL-MCNC: 560 PG/ML (ref 180–914)

## 2024-07-23 PROCEDURE — 82746 ASSAY OF FOLIC ACID SERUM: CPT | Performed by: PSYCHIATRY & NEUROLOGY

## 2024-07-23 PROCEDURE — 93005 ELECTROCARDIOGRAM TRACING: CPT

## 2024-07-23 PROCEDURE — 82306 VITAMIN D 25 HYDROXY: CPT | Performed by: PSYCHIATRY & NEUROLOGY

## 2024-07-23 PROCEDURE — 82607 VITAMIN B-12: CPT | Performed by: PSYCHIATRY & NEUROLOGY

## 2024-07-23 PROCEDURE — 93010 ELECTROCARDIOGRAM REPORT: CPT | Performed by: STUDENT IN AN ORGANIZED HEALTH CARE EDUCATION/TRAINING PROGRAM

## 2024-07-23 PROCEDURE — 73502 X-RAY EXAM HIP UNI 2-3 VIEWS: CPT

## 2024-07-23 PROCEDURE — 99222 1ST HOSP IP/OBS MODERATE 55: CPT | Performed by: NURSE PRACTITIONER

## 2024-07-23 PROCEDURE — 99223 1ST HOSP IP/OBS HIGH 75: CPT | Performed by: PSYCHIATRY & NEUROLOGY

## 2024-07-23 RX ORDER — BUTALBITAL, ACETAMINOPHEN AND CAFFEINE 50; 325; 40 MG/1; MG/1; MG/1
1 TABLET ORAL EVERY 4 HOURS PRN
Status: DISCONTINUED | OUTPATIENT
Start: 2024-07-23 | End: 2024-07-26 | Stop reason: HOSPADM

## 2024-07-23 RX ORDER — ARIPIPRAZOLE 10 MG/1
20 TABLET ORAL DAILY
Status: DISCONTINUED | OUTPATIENT
Start: 2024-07-23 | End: 2024-07-25

## 2024-07-23 RX ORDER — ERGOCALCIFEROL 1.25 MG/1
50000 CAPSULE ORAL WEEKLY
Status: DISCONTINUED | OUTPATIENT
Start: 2024-07-23 | End: 2024-07-26 | Stop reason: HOSPADM

## 2024-07-23 RX ORDER — FOLIC ACID 1 MG/1
1 TABLET ORAL DAILY
Status: DISCONTINUED | OUTPATIENT
Start: 2024-07-23 | End: 2024-07-26 | Stop reason: HOSPADM

## 2024-07-23 RX ORDER — BUPROPION HYDROCHLORIDE 150 MG/1
150 TABLET ORAL DAILY
Status: DISCONTINUED | OUTPATIENT
Start: 2024-07-24 | End: 2024-07-26 | Stop reason: HOSPADM

## 2024-07-23 RX ADMIN — ERGOCALCIFEROL 50000 UNITS: 1.25 CAPSULE ORAL at 12:29

## 2024-07-23 RX ADMIN — SERTRALINE HYDROCHLORIDE 150 MG: 100 TABLET ORAL at 12:29

## 2024-07-23 RX ADMIN — ARIPIPRAZOLE 20 MG: 10 TABLET ORAL at 14:06

## 2024-07-23 RX ADMIN — FOLIC ACID 1 MG: 1 TABLET ORAL at 12:29

## 2024-07-23 RX ADMIN — HYDROXYZINE HYDROCHLORIDE 25 MG: 25 TABLET ORAL at 12:36

## 2024-07-23 RX ADMIN — CYANOCOBALAMIN TAB 1000 MCG 1000 MCG: 1000 TAB at 12:29

## 2024-07-23 NOTE — TREATMENT PLAN
TREATMENT PLAN REVIEW - Behavioral Health Herminia De Paz 59 y.o. 1965 female MRN: [id number]    [address] [facility] [facility]-01 Encounter: [id number]        Admit Date/Time:  7/22/2024  6:21 PM    Treatment Team:   DO Massiel Torres RN Kimberly Deangelis Shernett Rose    Diagnosis: Principal Problem:    Bipolar affective disorder (HCC) current episode depressed, without psychotic features  Active Problems:    Medical clearance for psychiatric admission      Patient Strengths/Assets: ability for insight, average or above intelligence, capable of independent living, cooperative, communication skills, good past treatment response, motivation for treatment/growth, patient is on a voluntary commitment, stable housing, stable/recent employment, supportive family      Patient Barriers/Limitations: chronic mental illness, legal problems    Short Term Goals: decrease in depressive symptoms, decrease in anxiety symptoms, decrease in suicidal thoughts, sleep improvement, improvement in appetite, tolerate medications    Long Term Goals: improvement in depression, free of suicidal thoughts, adequate sleep, adequate appetite, appropriate interaction with peers, appropriate interaction with family    Progress Towards Goals: starting psychiatric medications as prescribed, attends groups    Recommended Treatment: medication management, patient medication education, group therapy, milieu therapy, continued Behavioral Health psychiatric evaluation/assessment process     Treatment Frequency: daily medication monitoring, group and milieu therapy daily, monitoring through interdisciplinary rounds, monitoring through weekly patient care conferences    Expected Discharge Date: 7 days - 7/30/2024    Discharge Plan: referral for outpatient medication management with a psychiatrist, referral for outpatient psychotherapy, return to  previous living arrangement    Treatment Plan Created/Updated By: Corina Balderrama MD

## 2024-07-23 NOTE — PROGRESS NOTES
Referral received for Outpatient Social Work Care Manager (OP SWCM) to outreach patient and assess needs.  Patient identified due to readmission risk score.    Per chart review, patient currently admitted to [facility] on [facility] from [facility].  Will follow.

## 2024-07-23 NOTE — PLAN OF CARE
Pt attended group and able to self reflect.     Problem: Depression  Goal: Attend and participate in unit activities, including therapeutic, recreational, and educational groups  Description: Interventions:  - Provide therapeutic and educational activities daily, encourage attendance and participation, and document same in the medical record   Outcome: Progressing

## 2024-07-23 NOTE — CONSULTS
[facility]  Consult  Name: Herminia De Paz 59 y.o. female I MRN: [id number]  Unit/Bed#: [facility] 609-01 I Date of Admission: 7/22/2024   Date of Service: 7/23/2024 I Hospital Day: 1    Inpatient consult for Medical Clearance for  patient  Consult performed by: GEOVANNA May  Consult ordered by: GEOVANNA Vila          Assessment & Plan   Medical clearance for psychiatric admission  Assessment & Plan  Vital signs stable afebrile patient seen and examined by myself Labs from 7/22/2024 were reviewed by myself sodium 137 potassium 4.1 creatinine 0.73  Patient medically stable for admit  Please reach out to Slim with any medical questions or concerns    Tobacco abuse  Assessment & Plan  Patient actively reports long-term tobacco abuse greater than 1 year approximately 6 cigarettes a day  At this time needs no nicotine replacement therapy per patient  Smoking cessation education    Vitamin D deficiency  Assessment & Plan  Vitamin D level 25.5  Start ergocalciferol 50,000 units p.o. weekly x 8 weeks  Patient is have a repeat vitamin D D level drawn and approximately 10 weeks             Counseling / Coordination of Care Time: 45 minutes.  Greater than 50% of total time spent on patient counseling and coordination of care.    Collaboration of Care: Were Recommendations Directly Discussed with Primary Treatment Team? - No     History of Present Illness:    Herminia De Paz is a 59 y.o. female who is originally admitted to the psychiatry service due to increased depression, suicidal ideation ran out of psych medications. We are consulted for medical clearance for admission to Behavioral Health Unit and treatment of underlying psychiatric illness.      Patient has a longstanding history of depression greater than 1 year as well as substance abuse currently in remission x 2 years and on probation.  Patient's presents to [facility] on 7/22/2024 with increased  depression and suicidal ideation.  Patient reports that she ran out of her Strattera and she is having a hard time getting the medication secondary to prior authorization.  She had a suicide attempt in 2021.  She lives at [facility].  She has sleep issues.  She has a history of substance abuse in the past and is 2 years sober and has been on probation.  Her U tox was positive for methamphetamine.  She had her last inpatient behavioral health stay was at [facility] in 2018.  She has no significant past medical history other than her mental health issues and her substance abuse.    Review of Systems:    Review of Systems   Constitutional:  Positive for activity change. Negative for chills and fever.   HENT:  Negative for ear pain and sore throat.    Eyes:  Negative for pain and visual disturbance.   Respiratory:  Negative for cough and shortness of breath.    Cardiovascular:  Negative for chest pain and palpitations.   Gastrointestinal:  Negative for abdominal pain and vomiting.   Genitourinary:  Negative for dysuria and hematuria.   Musculoskeletal:  Negative for arthralgias and back pain.   Skin:  Negative for color change and rash.   Neurological:  Negative for seizures and syncope.   Psychiatric/Behavioral:  Positive for decreased concentration and dysphoric mood.    All other systems reviewed and are negative.      Past Medical and Surgical History:     Past Medical History:   Diagnosis Date    ADHD (attention deficit hyperactivity disorder)     Allergic     Anemia     Anxiety     Bipolar affective disorder (formerly [facility]) current episode depressed, without psychotic features 7/23/2024    COPD (chronic obstructive pulmonary disease) (formerly [facility])     Depression     Headache(784.0) 1980    Hemangioma     Hypertension     Panic attack     Panic disorder     Psychiatric illness     PTSD (post-traumatic stress disorder)     Self-injurious behavior     Sleep difficulties     Substance abuse (formerly [facility])     Substance abuse (formerly [facility])  "08/15/2023    Suicide attempt (HCC)     Visual impairment        Past Surgical History:   Procedure Laterality Date     SECTION       SECTION         Meds/Allergies:    all medications and allergies reviewed    Allergies: No Known Allergies    Social History:     Marital Status:     Substance Use History:   Social History     Substance and Sexual Activity   Alcohol Use Not Currently     Social History     Tobacco Use   Smoking Status Every Day    Current packs/day: 0.50    Average packs/day: 0.5 packs/day for 35.0 years (17.5 ttl pk-yrs)    Types: Cigarettes   Smokeless Tobacco Never   Tobacco Comments    declines     Social History     Substance and Sexual Activity   Drug Use Not Currently    Types: Amphetamines, Methamphetamines    Comment: denies, reports positive uds is from [facility]       Family History:    non-contributory    Physical Exam:     Vitals:   Blood Pressure: 125/72 (24)  Pulse: 80 (24)  Temperature: 97.6 °F (36.4 °C) (24)  Temp Source: Temporal (24)  Respirations: 16 (24)  Height: 5' 3\" (160 cm) (24)  Weight - Scale: 57.9 kg (127 lb 9.6 oz) (24)  SpO2: 90 % (24)    Physical Exam  Constitutional:       General: She is not in acute distress.     Appearance: Normal appearance. She is not ill-appearing.   HENT:      Head: Normocephalic and atraumatic.      Nose: Nose normal.      Mouth/Throat:      Mouth: Mucous membranes are moist.   Eyes:      Extraocular Movements: Extraocular movements intact.   Cardiovascular:      Rate and Rhythm: Normal rate and regular rhythm.      Heart sounds: Normal heart sounds.   Pulmonary:      Breath sounds: Normal breath sounds. No wheezing.   Abdominal:      General: Abdomen is flat. Bowel sounds are normal.      Palpations: Abdomen is soft.   Skin:     General: Skin is warm and dry.      Capillary Refill: Capillary refill takes 2 to 3 seconds.   Neurological: "      Mental Status: She is alert and oriented to person, place, and time.   Psychiatric:         Behavior: Behavior normal.         Additional Data:     Lab Results: I have personally reviewed pertinent reports.      Results from last 7 days   Lab Units 07/22/24  1031   WBC Thousand/uL 7.87   HEMOGLOBIN g/dL 14.2   HEMATOCRIT % 45.1   PLATELETS Thousands/uL 315   SEGS PCT % 74   LYMPHO PCT % 18   MONO PCT % 5   EOS PCT % 2     Results from last 7 days   Lab Units 07/22/24  1031   SODIUM mmol/L 137   POTASSIUM mmol/L 4.1   CHLORIDE mmol/L 105   CO2 mmol/L 21   BUN mg/dL 13   CREATININE mg/dL 0.73   ANION GAP mmol/L 11   CALCIUM mg/dL 10.3*   ALBUMIN g/dL 4.5   TOTAL BILIRUBIN mg/dL 0.46   ALK PHOS U/L 99   ALT U/L 16   AST U/L 23   GLUCOSE RANDOM mg/dL 121             Lab Results   Component Value Date/Time    HGBA1C 6.0 (H) 07/22/2024 10:31 AM           EKG, Pathology, and Other Studies Reviewed on Admission:   EKG 7/22/2024 twelve-lead EKG reviewed by myself as well as interpreted by myself ventricular rate 99 QT interval 336 QTc 431 normal sinus rhythm with some nonspecific ST abnormalities there are no new acute EKG findings noted in this EKG and there are no previous EKGs to compare with.    ** Please Note: This note has been constructed using a voice recognition system. **    I have spent a total time of 45 minutes on 07/23/24 in caring for this patient including Diagnostic results, Prognosis, Risks and benefits of tx options, Instructions for management, Patient and family education, Importance of tx compliance, Risk factor reductions, Impressions, Counseling / Coordination of care, Documenting in the medical record, Reviewing / ordering tests, medicine, procedures  , Obtaining or reviewing history  , and Communicating with other healthcare professionals .
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
## 2024-07-23 NOTE — TREATMENT TEAM
07/23/24 1511   Team Meeting   Meeting Type Tx Team Meeting   Initial Conference Date 07/23/24   Team Members Present   Team Members Present Physician;Nurse;   Physician Team Member Dr Balderrama   Nursing Team Member Yvette   Care Management Team Member Kenzie   Patient/Family Present   Patient Present Yes   Patient's Family Present No     Treatment plan and goals reviewed; pt in agreement and signed plan.

## 2024-07-23 NOTE — ASSESSMENT & PLAN NOTE
Patient actively reports long-term tobacco abuse greater than 1 year approximately 6 cigarettes a day  At this time needs no nicotine replacement therapy per patient  Smoking cessation education

## 2024-07-23 NOTE — NURSING NOTE
Patient seclusive to room most of the day out for groups and meals with encouragement. Visible in dayroom but withdrawn to self during the evening Patient reported anxiety and depression and feeling overwhelmed with everyone she has had to speak with today. Denies  SI/HI.  Monitor for safety and report.

## 2024-07-23 NOTE — PROGRESS NOTES
07/23/24 1000   Activity/Group Checklist   Group Other (Comment)  (Community meeting: focus for the day and wellness)   Attendance Attended   Attendance Duration (min) 46-60   Interactions Other (Comment)  (needed prompted)   Affect/Mood Blunted/flat   Goals Achieved Identified feelings;Identified triggers;Identified relapse prevention strategies;Able to listen to others;Able to engage in interactions;Able to reflect/comment on own behavior;Verbalized increased hopefulness;Able to manage/cope with feelings;Able to self-disclose;Able to recieve feedback
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
## 2024-07-23 NOTE — TREATMENT TEAM
07/23/24 0834   Team Meeting   Meeting Type Daily Rounds   Initial Conference Date 07/23/24   Team Members Present   Team Members Present Physician;Nurse;   Physician Team Member Dr Gallegos, Gely AUSTIN, Dr Balderrama   Nursing Team Member Claudette   Care Management Team Member Kenzie   Patient/Family Present   Patient Present No   Patient's Family Present No      201 admission from [facility]; reports increased depression with SI, denies plan. Pt reports having issues with medications, insurance would not authorize pt's change in Stattera; UDS positive for Amph/Meth. H/O multiple suicide attempts via overdose on Tylenol and Heroin. PRN trazodone 50 mg at 2140 with good effect.

## 2024-07-23 NOTE — NURSING NOTE
Approached patient with new ordered medication. Patient stated feeling anxious and overwhelmed due to all the people she has had to speak with today.  PRN atarax 25 mg given for ross score of 8.

## 2024-07-23 NOTE — ASSESSMENT & PLAN NOTE
Vitamin D level 25.5  Start ergocalciferol 50,000 units p.o. weekly x 8 weeks  Patient is have a repeat vitamin D D level drawn and approximately 10 weeks

## 2024-07-23 NOTE — ASSESSMENT & PLAN NOTE
Vital signs stable afebrile patient seen and examined by myself Labs from 7/22/2024 were reviewed by myself sodium 137 potassium 4.1 creatinine 0.73  Patient medically stable for admit  Please reach out to Slim with any medical questions or concerns

## 2024-07-24 PROCEDURE — 99232 SBSQ HOSP IP/OBS MODERATE 35: CPT | Performed by: PSYCHIATRY & NEUROLOGY

## 2024-07-24 RX ORDER — SERTRALINE HYDROCHLORIDE 100 MG/1
200 TABLET, FILM COATED ORAL DAILY
Status: DISCONTINUED | OUTPATIENT
Start: 2024-07-25 | End: 2024-07-26 | Stop reason: HOSPADM

## 2024-07-24 RX ADMIN — CYANOCOBALAMIN TAB 1000 MCG 1000 MCG: 1000 TAB at 08:55

## 2024-07-24 RX ADMIN — SERTRALINE HYDROCHLORIDE 150 MG: 100 TABLET ORAL at 08:55

## 2024-07-24 RX ADMIN — FOLIC ACID 1 MG: 1 TABLET ORAL at 08:56

## 2024-07-24 RX ADMIN — BUPROPION HYDROCHLORIDE 150 MG: 150 TABLET, FILM COATED, EXTENDED RELEASE ORAL at 08:55

## 2024-07-24 RX ADMIN — ARIPIPRAZOLE 20 MG: 10 TABLET ORAL at 08:55

## 2024-07-24 NOTE — TREATMENT TEAM
07/24/24 0841   Team Meeting   Meeting Type Daily Rounds   Initial Conference Date 07/24/24   Team Members Present   Team Members Present Physician;Nurse;;Occupational Therapist;Other (Discipline and Name)   Physician Team Member Dr Gallegos, Gely AUSTIN, Dr Balderrama   Nursing Team Member Shirin   Care Management Team Member Kenzie   OT Team Member Corina   Other (Discipline and Name) Pharmacy: [facility]   Patient/Family Present   Patient Present No   Patient's Family Present No     Endorses depression and anxiety, denies SI; minimizes symptoms, PRN Atarax with good effect, attended group, initiated Wellbutrin and increase Zoloft

## 2024-07-24 NOTE — PROGRESS NOTES
"Progress Note - Behavioral Health   Herminia De Paz 59 y.o. female MRN: [id number]  Unit/Bed#: [facility] 609-01 Encounter: [id number]    Assessment & Plan   Principal Problem:    Bipolar affective disorder (HCC) current episode depressed, without psychotic features  Active Problems:    Medical clearance for psychiatric admission    Vitamin D deficiency    Tobacco abuse      Recommended Treatment:      Will make the following medication changes:  Continue with current medications:  Continue Abilify 20 mg daily for mood stabilization  increase Zoloft to 200 mg daily for depression and anxiety   Continue Wellbutrin 150 mg daily for depression augmentation and ADHD.   Continue with group therapy, milieu therapy and occupational therapy.    Continue frequent safety checks and vitals per unit protocol.  Continue with [facility] medical management as indicated  Continue coordinating with case management regarding disposition    Legal Status: 201  Disposition: To be determined, coordinating with case management    Case discussed with treatment team.  Risks, benefits and possible side effects of Medications: Risks, benefits, and possible side effects of medications have been explained to the patient, who verbalizes understanding    ------------------------------------------------------------    Subjective: Patient's chart was reviewed, and patient's progress and plan was discussed with treatment team. Per nursing report, Herminia has been cooperative on the unit and compliant with medications. She received PRN atarax 25 mg yesterday at 1241 for anxiety.she was withdrawn to her room in the evening.    Herminia was evaluated this morning for continuity of care. On examination, Herminia is just waking up, pleasant and cooperative. She states her mood is \"good.\" She feels as though her anxiety is manageable this morning, however, she still feels depressed. She reports sleeping adequately last night, getting approximately 6 hours, " "however, was up in the middle of the night because her roommate was having \"health issues\".  Her appetite has been normal. She denies adverse effects from medications. She denies suicidal ideation, homicidal ideation, auditory hallucinations, and visual hallucinations.     VS: Reviewed, within normal limits    Progress Toward Goals: slow improvement. Herminia is less anxious than previous, but still reports depression.     Psychiatric Review of Systems:  Behavior over the last 24 hours: improved  Sleep: normal  Appetite: adequate  Medication side effects: none verbalized  Medical ROS: Complete review of systems is negative except as noted above.    Vital signs in last 24 hours:  Temp:  [96.5 °F (35.8 °C)-97.6 °F (36.4 °C)] 96.5 °F (35.8 °C)  HR:  [80-85] 85  Resp:  [16] 16  BP: (119-138)/(71-85) 119/71    Mental Status Exam:    Appearance:  Appears stated age, wearing casual clothes, appropriate grooming and hygiene, good eye contact, long blond hair   Behavior:  calm, cooperative, and sitting comfortably   Speech:  spontaneous, clear, normal rate, normal volume, and coherent   Mood:  \"Good\"   Affect:  Constricted but reactive at times   Thought Process:  Organized, logical, goal-directed   Associations: intact associations   Thought Content:  no verbalized delusions or overt paranoia   Perceptual Disturbances: denies current hallucinations and does not appear to be responding to internal stimuli at this time   Risk Potential: Suicidal ideation - None  Homicidal ideation - None  Potential for aggression - No   Sensorium:  oriented to person, place, and time/date   Memory:  recent and remote memory grossly intact   Consciousness:  alert and awake   Attention/Concentration: attention span and concentration are age appropriate   Insight:  limited   Judgment: limited   Gait/Station: normal gait/station   Motor Activity: no abnormal movements     Current Medications:  Current Facility-Administered Medications   Medication " Dose Route Frequency Provider Last Rate    acetaminophen  650 mg Oral Q4H PRN GEOVANNA Vila      acetaminophen  650 mg Oral Q4H PRN GEOVANNA Vila      acetaminophen  975 mg Oral Q6H PRN GEOVANNA Vila      aluminum-magnesium hydroxide-simethicone  30 mL Oral Q4H PRN GEOVANNA Vila      ARIPiprazole  20 mg Oral Daily Corina Balderrama MD      benztropine  1 mg Intramuscular Q4H PRN Max 6/day GEOVANNA Vila      benztropine  0.5 mg Oral Q4H PRN Max 6/day GEOVANNA Vila      bisacodyl  10 mg Rectal Daily PRN GEOVANNA Vila      buPROPion  150 mg Oral Daily Corina Balderrama MD      butalbital-acetaminophen-caffeine  1 tablet Oral Q4H PRN GEOVANNA May      cyanocobalamin  1,000 mcg Oral Daily GEOVANNA May      ergocalciferol  50,000 Units Oral Weekly GEOVANNA May      folic acid  1 mg Oral Daily GEOVANNA May      hydrOXYzine HCL  25 mg Oral Q6H PRN Max 4/day GEOVANNA Vila      hydrOXYzine HCL  50 mg Oral Q6H PRN Max 4/day GEOVANNA Vila      LORazepam  1 mg Intramuscular Q6H PRN Max 3/day GEOVANNA Vila      LORazepam  1 mg Oral Q6H PRN Max 3/day GEOVANNA Vila      OLANZapine  5 mg Intramuscular Q3H PRN Max 3/day GEOVANNA Vila      OLANZapine  2.5 mg Oral Q4H PRN Max 6/day GEOVANNA Vila      OLANZapine  5 mg Oral Q4H PRN Max 3/day GEOVANNA Vila      OLANZapine  5 mg Oral Q3H PRN Max 3/day GEOVANNA Vila      polyethylene glycol  17 g Oral Daily PRN GEOVANNA Vila      propranolol  5 mg Oral Q8H PRN GEOVANNA Vila      senna-docusate sodium  1 tablet Oral Daily PRN GEOVANNA Vila      sertraline  150 mg Oral Daily Corina Balderrama MD      traZODone  50 mg Oral HS PRN Gely Hangey, CRNP         Behavioral Health Medications: all current active meds have been reviewed. Changes as in plan section above.    Laboratory results:  I have personally reviewed all pertinent laboratory/tests results.   Recent  Results (from the past 48 hour(s))   Comprehensive metabolic panel    Collection Time: 07/22/24 10:31 AM   Result Value Ref Range    Sodium 137 135 - 147 mmol/L    Potassium 4.1 3.5 - 5.3 mmol/L    Chloride 105 96 - 108 mmol/L    CO2 21 21 - 32 mmol/L    ANION GAP 11 4 - 13 mmol/L    BUN 13 5 - 25 mg/dL    Creatinine 0.73 0.60 - 1.30 mg/dL    Glucose 121 65 - 140 mg/dL    Calcium 10.3 (H) 8.4 - 10.2 mg/dL    AST 23 13 - 39 U/L    ALT 16 7 - 52 U/L    Alkaline Phosphatase 99 34 - 104 U/L    Total Protein 8.0 6.4 - 8.4 g/dL    Albumin 4.5 3.5 - 5.0 g/dL    Total Bilirubin 0.46 0.20 - 1.00 mg/dL    eGFR 90 ml/min/1.73sq m   CBC and differential    Collection Time: 07/22/24 10:31 AM   Result Value Ref Range    WBC 7.87 4.31 - 10.16 Thousand/uL    RBC 4.71 3.81 - 5.12 Million/uL    Hemoglobin 14.2 11.5 - 15.4 g/dL    Hematocrit 45.1 34.8 - 46.1 %    MCV 96 82 - 98 fL    MCH 30.1 26.8 - 34.3 pg    MCHC 31.5 31.4 - 37.4 g/dL    RDW 13.0 11.6 - 15.1 %    MPV 8.9 8.9 - 12.7 fL    Platelets 315 149 - 390 Thousands/uL    nRBC 0 /100 WBCs    Segmented % 74 43 - 75 %    Immature Grans % 0 0 - 2 %    Lymphocytes % 18 14 - 44 %    Monocytes % 5 4 - 12 %    Eosinophils Relative 2 0 - 6 %    Basophils Relative 1 0 - 1 %    Absolute Neutrophils 5.80 1.85 - 7.62 Thousands/µL    Absolute Immature Grans 0.03 0.00 - 0.20 Thousand/uL    Absolute Lymphocytes 1.43 0.60 - 4.47 Thousands/µL    Absolute Monocytes 0.40 0.17 - 1.22 Thousand/µL    Eosinophils Absolute 0.13 0.00 - 0.61 Thousand/µL    Basophils Absolute 0.08 0.00 - 0.10 Thousands/µL   TSH, 3rd generation with Free T4 reflex    Collection Time: 07/22/24 10:31 AM   Result Value Ref Range    TSH 3RD GENERATON 6.962 (H) 0.450 - 4.500 uIU/mL   T4, free    Collection Time: 07/22/24 10:31 AM   Result Value Ref Range    Free T4 0.63 0.61 - 1.12 ng/dL   TSH, 3rd generation with Free T4 reflex    Collection Time: 07/22/24 10:31 AM   Result Value Ref Range    TSH 3RD GENERATON 6.484 (H) 0.450  - 4.500 uIU/mL   Hemoglobin A1C    Collection Time: 07/22/24 10:31 AM   Result Value Ref Range    Hemoglobin A1C 6.0 (H) Normal 4.0-5.6%; PreDiabetic 5.7-6.4%; Diabetic >=6.5%; Glycemic control for adults with diabetes <7.0% %     mg/dl   T4, free    Collection Time: 07/22/24 10:31 AM   Result Value Ref Range    Free T4 0.67 0.61 - 1.12 ng/dL   ECG 12 lead    Collection Time: 07/22/24 10:35 AM   Result Value Ref Range    Ventricular Rate 99 BPM    Atrial Rate 99 BPM    ME Interval 130 ms    QRSD Interval 76 ms    QT Interval 336 ms    QTC Interval 431 ms    P Axis 74 degrees    QRS Axis 62 degrees    T Wave Axis 82 degrees   Rapid drug screen, urine    Collection Time: 07/22/24 12:22 PM   Result Value Ref Range    Amph/Meth UR Positive (A) Negative    Barbiturate Ur Negative Negative    Benzodiazepine Urine Negative Negative    Cocaine Urine Negative Negative    Methadone Urine Negative Negative    Opiate Urine Negative Negative    PCP Ur Negative Negative    THC Urine Negative Negative    Oxycodone Urine Negative Negative    Fentanyl Urine Negative Negative    HYDROCODONE URINE Negative Negative   Urine Macroscopic, POC    Collection Time: 07/22/24 12:23 PM   Result Value Ref Range    Color, UA Yellow     Clarity, UA Clear     pH, UA 6.0 4.5 - 8.0    Leukocytes, UA Small (A) Negative    Nitrite, UA Negative Negative    Protein, UA Negative Negative mg/dl    Glucose, UA Negative Negative mg/dl    Ketones, UA Negative Negative mg/dl    Urobilinogen, UA 0.2 0.2, 1.0 E.U./dl E.U./dl    Bilirubin, UA Negative Negative    Occult Blood, UA Trace (A) Negative    Specific Gravity, UA >=1.030 1.003 - 1.030   Urine Microscopic    Collection Time: 07/22/24 12:23 PM   Result Value Ref Range    RBC, UA 4-10 (A) None Seen, 1-2 /hpf    WBC, UA 10-20 (A) None Seen, 1-2 /hpf    Epithelial Cells Occasional None Seen, Occasional /hpf    Bacteria, UA Occasional None Seen, Occasional /hpf    MUCUS THREADS Innumerable (A) None  Seen    Hyaline Casts, UA 0-3 (A) None Seen /lpf   Urine culture    Collection Time: 07/22/24 12:23 PM    Specimen: Urine, Clean Catch   Result Value Ref Range    Urine Culture 30,000-39,000 cfu/ml    Folate    Collection Time: 07/23/24  6:44 AM   Result Value Ref Range    Folate 7.4 >5.9 ng/mL   Vitamin B12    Collection Time: 07/23/24  6:44 AM   Result Value Ref Range    Vitamin B-12 560 180 - 914 pg/mL   Vitamin D 25 hydroxy    Collection Time: 07/23/24  6:44 AM   Result Value Ref Range    Vit D, 25-Hydroxy 25.5 (L) 30.0 - 100.0 ng/mL   ECG 12 lead    Collection Time: 07/23/24 11:29 AM   Result Value Ref Range    Ventricular Rate 76 BPM    Atrial Rate 76 BPM    NM Interval 138 ms    QRSD Interval 76 ms    QT Interval 366 ms    QTC Interval 411 ms    P [place] 68 degrees    QRS Axis 68 degrees    T Wave Axis 70 degrees   ECG 12 lead    Collection Time: 07/23/24 11:29 AM   Result Value Ref Range    Ventricular Rate 76 BPM    Atrial Rate 76 BPM    NM Interval 138 ms    QRSD Interval 76 ms    QT Interval 366 ms    QTC Interval 411 ms    P [place] 68 degrees    QRS Axis 68 degrees    T Wave Axis 70 degrees        This note has been constructed using a voice recognition system. There may be translation, syntax, or grammatical errors. If you have any questions, please contact the dictating author.    Corina Balderrama MD  Psychiatry Residency, PGY-2

## 2024-07-24 NOTE — NURSING NOTE
Patient alert, pleasant and cooperative with care, able to make needs known, visible in the milieu socializing with a select group of peers, no SI,HI,or AV/H expressed, safety maintained.

## 2024-07-24 NOTE — NURSING NOTE
Pt withdrawn to room this evening, pleasant on approach. Denies depression and anxiety, denies further s/s or unmet needs. Will maintain q7min checks.

## 2024-07-24 NOTE — NURSING NOTE
Patient is alert and visible for meals otherwise withdrawn to room. Pleasant and able to make needs known. Denies all psych s/s. Medication compliant and cooperative with care. Safety precautions maintained.

## 2024-07-24 NOTE — PLAN OF CARE
Problem: Alteration in Thoughts and Perception  Goal: Treatment Goal: Gain control of psychotic behaviors/thinking, reduce/eliminate presenting symptoms and demonstrate improved reality functioning upon discharge  Outcome: Progressing  Goal: Agree to be compliant with medication regime, as prescribed and report medication side effects  Description: Interventions:  - Offer appropriate PRN medication and supervise ingestion; conduct AIMS, as needed   Outcome: Progressing     Problem: Depression  Goal: Treatment Goal: Demonstrate behavioral control of depressive symptoms, verbalize feelings of improved mood/affect, and adopt new coping skills prior to discharge  Outcome: Progressing  Goal: Verbalize thoughts and feelings  Description: Interventions:  - Assess and re-assess patient's level of risk   - Engage patient in 1:1 interactions, daily, for a minimum of 15 minutes   - Encourage patient to express feelings, fears, frustrations, hopes   Outcome: Progressing  Goal: Refrain from harming self  Description: Interventions:  - Monitor patient closely, per order   - Supervise medication ingestion, monitor effects and side effects   Outcome: Progressing     Problem: Anxiety  Goal: Anxiety is at manageable level  Description: Interventions:  - Assess and monitor patient's anxiety level.   - Monitor for signs and symptoms (heart palpitations, chest pain, shortness of breath, headaches, nausea, feeling jumpy, restlessness, irritable, apprehensive).   - Collaborate with interdisciplinary team and initiate plan and interventions as ordered.  - [place] patient to unit/surroundings  - Explain treatment plan  - Encourage participation in care  - Encourage verbalization of concerns/fears  - Identify coping mechanisms  - Assist in developing anxiety-reducing skills  - Administer/offer alternative therapies  - Limit or eliminate stimulants  Outcome: Progressing

## 2024-07-24 NOTE — CASE MANAGEMENT
Call made to [facility] tel# [phone], spoke with  Viktoriya CLARKE; informed pt's psych mobile rehab  is [facility] tel# [phone] and pt's Housing  is [facility] tel# [phone]. Viktoriya reports pt resides at [facility] and [facility] would be the main contact regarding pt's return to apt. CM informed Viktoriya of pt's status and + UDS.

## 2024-07-25 ENCOUNTER — TELEPHONE (OUTPATIENT)
Age: 59
End: 2024-07-25

## 2024-07-25 PROBLEM — Z00.8 MEDICAL CLEARANCE FOR PSYCHIATRIC ADMISSION: Status: RESOLVED | Noted: 2024-02-22 | Resolved: 2024-07-25

## 2024-07-25 PROCEDURE — 99232 SBSQ HOSP IP/OBS MODERATE 35: CPT | Performed by: PSYCHIATRY & NEUROLOGY

## 2024-07-25 RX ORDER — ERGOCALCIFEROL 1.25 MG/1
50000 CAPSULE ORAL WEEKLY
Qty: 4 CAPSULE | Refills: 0 | Status: SHIPPED | OUTPATIENT
Start: 2024-07-30 | End: 2024-09-11

## 2024-07-25 RX ORDER — FOLIC ACID 1 MG/1
1 TABLET ORAL DAILY
Qty: 30 TABLET | Refills: 1 | Status: SHIPPED | OUTPATIENT
Start: 2024-07-26 | End: 2024-09-24

## 2024-07-25 RX ORDER — BUPROPION HYDROCHLORIDE 150 MG/1
150 TABLET ORAL DAILY
Qty: 30 TABLET | Refills: 1 | Status: SHIPPED | OUTPATIENT
Start: 2024-07-26 | End: 2024-09-24

## 2024-07-25 RX ORDER — ARIPIPRAZOLE 10 MG/1
20 TABLET ORAL
Status: DISCONTINUED | OUTPATIENT
Start: 2024-07-26 | End: 2024-07-26 | Stop reason: HOSPADM

## 2024-07-25 RX ORDER — ARIPIPRAZOLE 20 MG/1
20 TABLET ORAL DAILY
Qty: 30 TABLET | Refills: 1 | Status: SHIPPED | OUTPATIENT
Start: 2024-07-25 | End: 2024-09-23

## 2024-07-25 RX ORDER — SERTRALINE HYDROCHLORIDE 100 MG/1
200 TABLET, FILM COATED ORAL DAILY
Qty: 60 TABLET | Refills: 1 | Status: SHIPPED | OUTPATIENT
Start: 2024-07-26 | End: 2024-09-24

## 2024-07-25 RX ORDER — CALCIUM CARBONATE/VITAMIN D3 600 MG-10
1000 TABLET ORAL DAILY
Qty: 120 TABLET | Refills: 1 | Status: SHIPPED | OUTPATIENT
Start: 2024-07-25 | End: 2024-09-23

## 2024-07-25 RX ADMIN — SERTRALINE HYDROCHLORIDE 200 MG: 100 TABLET ORAL at 08:44

## 2024-07-25 RX ADMIN — ARIPIPRAZOLE 20 MG: 10 TABLET ORAL at 08:44

## 2024-07-25 RX ADMIN — FOLIC ACID 1 MG: 1 TABLET ORAL at 08:44

## 2024-07-25 RX ADMIN — BUPROPION HYDROCHLORIDE 150 MG: 150 TABLET, FILM COATED, EXTENDED RELEASE ORAL at 08:44

## 2024-07-25 RX ADMIN — CYANOCOBALAMIN TAB 1000 MCG 1000 MCG: 1000 TAB at 08:44

## 2024-07-25 NOTE — BH TRANSITION RECORD
Contact Information: If you have any questions, concerns, pended studies, tests and/or procedures, or emergencies regarding your inpatient behavioral health visit. Please contact [facility] 6T [phone] and ask to speak to a , nurse or physician. A contact is available 24 hours/ 7 days a week at this number.     Summary of Procedures Performed During your Stay:  Below is a list of major procedures performed during your hospital stay and a summary of results:  EKG 7/23/24: NSR, VR 76, , Qtc 411    Pending Studies (From admission, onward)      None          Please follow up on the above pending studies with your PCP and/or referring provider.

## 2024-07-25 NOTE — TELEPHONE ENCOUNTER
Received ASAP referral for hospital d/c, scheduled pt to be seen at   [facility] location by Darion Rahman on 9/9/24 at 4:00 pm.

## 2024-07-25 NOTE — TREATMENT TEAM
07/25/24 0828   Team Meeting   Meeting Type Daily Rounds   Initial Conference Date 07/25/24   Team Members Present   Team Members Present Physician;Nurse;   Physician Team Member Dr Gallegos, Dr Balderrama, Gely AUSTIN   Nursing Team Member Shirin   Care Management Team Member Kenzie   Patient/Family Present   Patient Present No   Patient's Family Present No     Withdrawn, out for meals, med compliant, eating well, slept, denies depression and anxiety, plan d/c Monday

## 2024-07-25 NOTE — PLAN OF CARE
Problem: Alteration in Thoughts and Perception  Goal: Treatment Goal: Gain control of psychotic behaviors/thinking, reduce/eliminate presenting symptoms and demonstrate improved reality functioning upon discharge  Outcome: Progressing  Goal: Agree to be compliant with medication regime, as prescribed and report medication side effects  Description: Interventions:  - Offer appropriate PRN medication and supervise ingestion; conduct AIMS, as needed   Outcome: Progressing     Problem: Ineffective Coping  Goal: Cooperates with admission process  Description: Interventions:   - Complete admission process  Outcome: Progressing  Goal: Identifies healthy coping skills  Outcome: Progressing  Goal: Patient/Family participate in treatment and DC plans  Description: Interventions:  - Provide therapeutic environment  Outcome: Progressing  Goal: Patient/Family verbalizes awareness of resources  Outcome: Progressing  Goal: Understands least restrictive measures  Description: Interventions:  - Utilize least restrictive behavior  Outcome: Progressing  Goal: Free from restraint events  Description: - Utilize least restrictive measures   - Provide behavioral interventions   - Redirect inappropriate behaviors   Outcome: Progressing     Problem: Depression  Goal: Treatment Goal: Demonstrate behavioral control of depressive symptoms, verbalize feelings of improved mood/affect, and adopt new coping skills prior to discharge  Outcome: Progressing  Goal: Verbalize thoughts and feelings  Description: Interventions:  - Assess and re-assess patient's level of risk   - Engage patient in 1:1 interactions, daily, for a minimum of 15 minutes   - Encourage patient to express feelings, fears, frustrations, hopes   Outcome: Progressing  Goal: Refrain from harming self  Description: Interventions:  - Monitor patient closely, per order   - Supervise medication ingestion, monitor effects and side effects   Outcome: Progressing  Goal: Refrain from  isolation  Description: Interventions:  - Develop a trusting relationship   - Encourage socialization   Outcome: Progressing  Goal: Refrain from self-neglect  Outcome: Progressing  Goal: Attend and participate in unit activities, including therapeutic, recreational, and educational groups  Description: Interventions:  - Provide therapeutic and educational activities daily, encourage attendance and participation, and document same in the medical record   Outcome: Progressing  Goal: Complete daily ADLs, including personal hygiene independently, as able  Description: Interventions:  - Observe, teach, and assist patient with ADLS  -  Monitor and promote a balance of rest/activity, with adequate nutrition and elimination   Outcome: Progressing     Problem: Anxiety  Goal: Anxiety is at manageable level  Description: Interventions:  - Assess and monitor patient's anxiety level.   - Monitor for signs and symptoms (heart palpitations, chest pain, shortness of breath, headaches, nausea, feeling jumpy, restlessness, irritable, apprehensive).   - Collaborate with interdisciplinary team and initiate plan and interventions as ordered.  - [place] patient to unit/surroundings  - Explain treatment plan  - Encourage participation in care  - Encourage verbalization of concerns/fears  - Identify coping mechanisms  - Assist in developing anxiety-reducing skills  - Administer/offer alternative therapies  - Limit or eliminate stimulants  Outcome: Progressing     Problem: DISCHARGE PLANNING - CARE MANAGEMENT  Goal: Discharge to post-acute care or home with appropriate resources  Description: INTERVENTIONS:  - Conduct assessment to determine patient/family and health care team treatment goals, and need for post-acute services based on payer coverage, community resources, and patient preferences, and barriers to discharge  - Address psychosocial, clinical, and financial barriers to discharge as identified in assessment in conjunction with the  patient/family and health care team  - Arrange appropriate level of post-acute services according to patient’s   needs and preference and payer coverage in collaboration with the physician and health care team  - Communicate with and update the patient/family, physician, and health care team regarding progress on the discharge plan  - Arrange appropriate transportation to post-acute venues  Outcome: Progressing

## 2024-07-25 NOTE — PROGRESS NOTES
"Progress Note - Behavioral Health   Herminia De Paz 59 y.o. female MRN: [id number]  Unit/Bed#: [facility] 609-01 Encounter: [id number]    Assessment & Plan   Principal Problem:    Bipolar affective disorder (HCC) current episode depressed, without psychotic features  Active Problems:    Medical clearance for psychiatric admission    Vitamin D deficiency    Tobacco abuse      Recommended Treatment:      No psychopharmacologic changes necessary at this time; will continue to assess for further optimization.  Continue with current medications:  Continue Abilify 20 mg daily for mood stabilization  Continue Zoloft 200 mg daily for depression and anxiety  Continue Wellbutrin 150 mg daily for depression augmentation and ADHD  Continue with group therapy, milieu therapy and occupational therapy.    Continue frequent safety checks and vitals per unit protocol.  Continue with SLIM medical management as indicated  Continue coordinating with case management regarding disposition    Legal Status: 201  Disposition: To be determined, coordinating with case management    Case discussed with treatment team.  Risks, benefits and possible side effects of Medications: Risks, benefits, and possible side effects of medications have been explained to the patient, who verbalizes understanding    ------------------------------------------------------------    Subjective: Patient's chart was reviewed, and patient's progress and plan was discussed with treatment team. Per nursing report, Herminia has been cooperative on the unit and compliant with medications. She is pleasant on approach, though often withdrawn to her room. She did not require any PRN medications overnight.     Herminia was evaluated this morning for continuity of care. On examination, Herminia is laying in bed, pleasant and cooperative. She states her mood is \"much better.\" She reports sleeping well, and she has a normal appetite. She says her depression had improved since " "admission, but admits to an increase in anxiety due to her not working. She denies adverse effects from medications. She denies suicidal ideation, homicidal ideation, auditory hallucinations, and visual hallucinations.     VS: Reviewed, within normal limits    Progress Toward Goals: slow improvement. Herminia feels less depressed than previous and denies both active and passive SI. She would like to get back to work.     Psychiatric Review of Systems:  Behavior over the last 24 hours: improved  Sleep: normal  Appetite: adequate  Medication side effects: none verbalized  Medical ROS: Complete review of systems is negative except as noted above.    Vital signs in last 24 hours:  Temp:  [97 °F (36.1 °C)-97.7 °F (36.5 °C)] 97 °F (36.1 °C)  HR:  [78-91] 78  Resp:  [16-17] 17  BP: (123-139)/(69-78) 124/74    Mental Status Exam:    Appearance:  Appears stated age, appropriate grooming and hygiene, good eye contact, wearing casual clothes, long blonde hair.   Behavior:  calm, cooperative, and sitting comfortably   Speech:  spontaneous, clear, normal rate, normal volume, and coherent   Mood:  \"Much better\"   Affect:  Constricted but reactive   Thought Process:  Organized, logical, goal-directed   Associations: intact associations   Thought Content:  no verbalized delusions or overt paranoia   Perceptual Disturbances: denies current hallucinations and does not appear to be responding to internal stimuli at this time   Risk Potential: Suicidal ideation - None  Homicidal ideation - None  Potential for aggression - No   Sensorium:  oriented to person, place, and time/date   Memory:  recent and remote memory grossly intact   Consciousness:  alert and awake   Attention/Concentration: attention span and concentration are age appropriate   Insight:  improving   Judgment: improving   Gait/Station: normal gait/station   Motor Activity: no abnormal movements     Current Medications:  Current Facility-Administered Medications "   Medication Dose Route Frequency Provider Last Rate    acetaminophen  650 mg Oral Q4H PRN GEOVANNA Vila      acetaminophen  650 mg Oral Q4H PRN GEOVANNA Vila      acetaminophen  975 mg Oral Q6H PRN GEOVANNA Vila      aluminum-magnesium hydroxide-simethicone  30 mL Oral Q4H PRN GEOVANNA iVla      ARIPiprazole  20 mg Oral Daily Corina Balderrama MD      benztropine  1 mg Intramuscular Q4H PRN Max 6/day GEOVANNA Vila      benztropine  0.5 mg Oral Q4H PRN Max 6/day GEOVANNA Vila      bisacodyl  10 mg Rectal Daily PRN GEOVANNA Vila      buPROPion  150 mg Oral Daily Corina Balderrama MD      butalbital-acetaminophen-caffeine  1 tablet Oral Q4H PRN GEOVANNA May      cyanocobalamin  1,000 mcg Oral Daily GEOVANNA May      ergocalciferol  50,000 Units Oral Weekly GEOVANNA May      folic acid  1 mg Oral Daily GEOVANNA May      hydrOXYzine HCL  25 mg Oral Q6H PRN Max 4/day GEOVANNA Vila      hydrOXYzine HCL  50 mg Oral Q6H PRN Max 4/day GEOVANNA Vila      LORazepam  1 mg Intramuscular Q6H PRN Max 3/day GEOVANNA Vila      LORazepam  1 mg Oral Q6H PRN Max 3/day GEOVANNA Vila      OLANZapine  5 mg Intramuscular Q3H PRN Max 3/day GEOVANNA Vila      OLANZapine  2.5 mg Oral Q4H PRN Max 6/day GEOVANNA Vila      OLANZapine  5 mg Oral Q4H PRN Max 3/day GEOVNANA Vila      OLANZapine  5 mg Oral Q3H PRN Max 3/day GEOVANNA Vila      polyethylene glycol  17 g Oral Daily PRN GEOVANNA Vila      propranolol  5 mg Oral Q8H PRN GEOVANNA Vila      senna-docusate sodium  1 tablet Oral Daily PRN GEOVANNA Vila      sertraline  200 mg Oral Daily Corina Balderrama MD      traZODone  50 mg Oral HS PRN GEOVANNA Vila         Behavioral Health Medications: all current active meds have been reviewed. Changes as in plan section above.    Laboratory results:  I have personally reviewed all pertinent laboratory/tests  results.   Recent Results (from the past 48 hour(s))   ECG 12 lead    Collection Time: 07/23/24 11:29 AM   Result Value Ref Range    Ventricular Rate 76 BPM    Atrial Rate 76 BPM    WV Interval 138 ms    QRSD Interval 76 ms    QT Interval 366 ms    QTC Interval 411 ms    P [place] 68 degrees    QRS Axis 68 degrees    T Wave Axis 70 degrees   ECG 12 lead    Collection Time: 07/23/24 11:29 AM   Result Value Ref Range    Ventricular Rate 76 BPM    Atrial Rate 76 BPM    WV Interval 138 ms    QRSD Interval 76 ms    QT Interval 366 ms    QTC Interval 411 ms    P [place] 68 degrees    QRS Axis 68 degrees    T Wave Axis 70 degrees        This note has been constructed using a voice recognition system. There may be translation, syntax, or grammatical errors. If you have any questions, please contact the dictating author.    Corina Balderrama MD  Psychiatry Residency, PGY-2

## 2024-07-25 NOTE — NURSING NOTE
Patient cooperative with care, medication compliant, visible in milieu, out of room for meals. Isolative to self, endorsed anxiety/depression, denied SI/HI, AH/VH. Safety precautions maintained.

## 2024-07-25 NOTE — CASE MANAGEMENT
CM rec'd response from  Psych intake of pt being referred to [facility]. CM rec'd following message: patient has been scheduled at [facility], [address], [place], [address] with our next available appointment for 9/9/24 at 4:00 pm with Darion Rahman.     Given pt is self-pay in order to obtain self-pay rate at the discounted price pt would need to pay $30 co-pay at time of visit and complete Acknowledgement of Self-Pay status form.       Call made to [facility], pt's mobile psych rehab  tel# [phone]. No reports pt was not doing well for past 2 months; including using Adderall purchased on the street. No reports having notified probation of pt's admission. No reports pt has SPOAR  Mariam who is on mental health side of probation.. Pt also has separate  although No does not have that individual's information. No reports having had conversation with pt prior to admission regarding pt's ongoing contact with friend that is active user. No reports pt cont to deny need for [facility] treatment. CM informed Viktoriya of pt's referral to [facility] and pt being given appt at [facility] in [place]. No reports able to assist pt temporarily with transportation to [facility] until pt may be established with transportation or closer provider. CM informed No of copay information also. CM reviewed plans for d/c tomorrow and need to confirm pt's return to apt with [facility]. Viktoriya reports able to provide d/c transportation tomorrow. Viktoriya to  pt tomorrow at noon.       Call made to [facility], tel# [phone]; lvm requesting c/b.

## 2024-07-25 NOTE — CASE MANAGEMENT
Cm met with pt, reviewed appt and message rec'd by  Intake. Pt in agreement with copay and follow up at [facility]. CM reviewed information provided by No regarding pt's use of Adderall. Pt reports obtained Adderall from friend. CM reviewed risks of ongoing substance use and benefits of [facility] treatment. Pt declined inpt treatment due to fear of losing job. Pt reports not liking NA meetings. CM reviewed d/c tomorrow. Pt in agreement, denies psych s/s and requested d/c meds to be sent to [facility] pharmacy. CM informed pt of No to transport at noon. Pt signed CHARBEL for [facility] for CM to call and cancel pt's treatment with Dr Marie.    Call made to [facility], informed that pt must call to cancel treatment services, Cm informed pt of this.

## 2024-07-26 VITALS
OXYGEN SATURATION: 92 % | RESPIRATION RATE: 20 BRPM | WEIGHT: 127.6 LBS | HEART RATE: 79 BPM | BODY MASS INDEX: 22.61 KG/M2 | TEMPERATURE: 97.3 F | DIASTOLIC BLOOD PRESSURE: 90 MMHG | HEIGHT: 63 IN | SYSTOLIC BLOOD PRESSURE: 136 MMHG

## 2024-07-26 PROCEDURE — 99238 HOSP IP/OBS DSCHRG MGMT 30/<: CPT | Performed by: PSYCHIATRY & NEUROLOGY

## 2024-07-26 RX ADMIN — SERTRALINE HYDROCHLORIDE 200 MG: 100 TABLET ORAL at 08:32

## 2024-07-26 RX ADMIN — CYANOCOBALAMIN TAB 1000 MCG 1000 MCG: 1000 TAB at 08:32

## 2024-07-26 RX ADMIN — FOLIC ACID 1 MG: 1 TABLET ORAL at 08:32

## 2024-07-26 RX ADMIN — BUPROPION HYDROCHLORIDE 150 MG: 150 TABLET, FILM COATED, EXTENDED RELEASE ORAL at 08:32

## 2024-07-26 NOTE — PLAN OF CARE
Problem: Depression  Goal: Treatment Goal: Demonstrate behavioral control of depressive symptoms, verbalize feelings of improved mood/affect, and adopt new coping skills prior to discharge  Outcome: Progressing  Goal: Verbalize thoughts and feelings  Description: Interventions:  - Assess and re-assess patient's level of risk   - Engage patient in 1:1 interactions, daily, for a minimum of 15 minutes   - Encourage patient to express feelings, fears, frustrations, hopes   Outcome: Progressing  Goal: Refrain from harming self  Description: Interventions:  - Monitor patient closely, per order   - Supervise medication ingestion, monitor effects and side effects   Outcome: Progressing  Goal: Refrain from isolation  Description: Interventions:  - Develop a trusting relationship   - Encourage socialization   Outcome: Progressing  Goal: Refrain from self-neglect  Outcome: Progressing  Goal: Attend and participate in unit activities, including therapeutic, recreational, and educational groups  Description: Interventions:  - Provide therapeutic and educational activities daily, encourage attendance and participation, and document same in the medical record   Outcome: Progressing  Goal: Complete daily ADLs, including personal hygiene independently, as able  Description: Interventions:  - Observe, teach, and assist patient with ADLS  -  Monitor and promote a balance of rest/activity, with adequate nutrition and elimination   Outcome: Progressing     Problem: Anxiety  Goal: Anxiety is at manageable level  Description: Interventions:  - Assess and monitor patient's anxiety level.   - Monitor for signs and symptoms (heart palpitations, chest pain, shortness of breath, headaches, nausea, feeling jumpy, restlessness, irritable, apprehensive).   - Collaborate with interdisciplinary team and initiate plan and interventions as ordered.  - [place] patient to unit/surroundings  - Explain treatment plan  - Encourage participation in  care  - Encourage verbalization of concerns/fears  - Identify coping mechanisms  - Assist in developing anxiety-reducing skills  - Administer/offer alternative therapies  - Limit or eliminate stimulants  Outcome: Progressing

## 2024-07-26 NOTE — TREATMENT TEAM
07/26/24 0836   Team Meeting   Meeting Type Daily Rounds   Initial Conference Date 07/26/24   Team Members Present   Team Members Present Physician;Nurse;;Occupational Therapist   Physician Team Member Dr Gallegos, Dr Balderrama, Gely AUSTIN   Nursing Team Member Claudette   Care Management Team Member Kenzie SINGH Team Member Leighann   Patient/Family Present   Patient Present No   Patient's Family Present No     Endorses anxiety and depression, visible for meals, med compliant, showered, slept, attended one group, plan d/c today, to be transported by [facility]

## 2024-07-26 NOTE — NURSING NOTE
Pt pleasant and cooperative. Expresses being tired due to her Abilify was happy times were adjusted on it. Appetite fair. Denies SI/HI. Q 7 minute checks maintained.

## 2024-07-26 NOTE — PROGRESS NOTES
07/26/24 0950   Activity/Group Checklist   Group Admission/Discharge  (Relapse prevention plan)   Attendance Attended   Attendance Duration (min) 0-15   Interactions Interacted appropriately   Affect/Mood Appropriate   Goals Achieved Identified feelings;Identified triggers;Identified relapse prevention strategies;Discussed coping strategies;Identified resources and support systems;Able to self-disclose;Able to recieve feedback     Patient completed the relapse prevention plan
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
## 2024-07-26 NOTE — PLAN OF CARE
Problem: Alteration in Thoughts and Perception  Goal: Treatment Goal: Gain control of psychotic behaviors/thinking, reduce/eliminate presenting symptoms and demonstrate improved reality functioning upon discharge  7/26/2024 1006 by Cathy Greco RN  Outcome: Adequate for Discharge  7/26/2024 1006 by Cathy Greco RN  Outcome: Adequate for Discharge  Goal: Agree to be compliant with medication regime, as prescribed and report medication side effects  Description: Interventions:  - Offer appropriate PRN medication and supervise ingestion; conduct AIMS, as needed   7/26/2024 1006 by Cathy Greco RN  Outcome: Adequate for Discharge  7/26/2024 1006 by Cathy Greco RN  Outcome: Adequate for Discharge     Problem: Ineffective Coping  Goal: Cooperates with admission process  Description: Interventions:   - Complete admission process  7/26/2024 1006 by Cathy Greco RN  Outcome: Adequate for Discharge  7/26/2024 1006 by Cathy Greco RN  Outcome: Adequate for Discharge  Goal: Identifies healthy coping skills  7/26/2024 1006 by Cathy Greco RN  Outcome: Adequate for Discharge  7/26/2024 1006 by Cathy Greco RN  Outcome: Adequate for Discharge  Goal: Patient/Family participate in treatment and DC plans  Description: Interventions:  - Provide therapeutic environment  7/26/2024 1006 by Cathy Greco RN  Outcome: Adequate for Discharge  7/26/2024 1006 by Cathy Greco RN  Outcome: Adequate for Discharge  Goal: Patient/Family verbalizes awareness of resources  7/26/2024 1006 by Cathy Greco RN  Outcome: Adequate for Discharge  7/26/2024 1006 by Cathy Greco RN  Outcome: Adequate for Discharge  Goal: Understands least restrictive measures  Description: Interventions:  - Utilize least restrictive behavior  7/26/2024 1006 by Cathy Greco RN  Outcome: Adequate for Discharge  7/26/2024 1006 by Cathy Greco RN  Outcome: Adequate for Discharge  Goal: Free from restraint events  Description: - Utilize least restrictive measures   - Provide behavioral  interventions   - Redirect inappropriate behaviors   7/26/2024 1006 by Cathy Greco RN  Outcome: Adequate for Discharge  7/26/2024 1006 by Cathy Greco RN  Outcome: Adequate for Discharge     Problem: Depression  Goal: Treatment Goal: Demonstrate behavioral control of depressive symptoms, verbalize feelings of improved mood/affect, and adopt new coping skills prior to discharge  7/26/2024 1006 by Cathy Greco RN  Outcome: Adequate for Discharge  7/26/2024 1006 by Cathy Greco RN  Outcome: Adequate for Discharge  Goal: Verbalize thoughts and feelings  Description: Interventions:  - Assess and re-assess patient's level of risk   - Engage patient in 1:1 interactions, daily, for a minimum of 15 minutes   - Encourage patient to express feelings, fears, frustrations, hopes   7/26/2024 1006 by Cathy Greco RN  Outcome: Adequate for Discharge  7/26/2024 1006 by Cathy Greco RN  Outcome: Adequate for Discharge  Goal: Refrain from harming self  Description: Interventions:  - Monitor patient closely, per order   - Supervise medication ingestion, monitor effects and side effects   7/26/2024 1006 by Cathy Greco RN  Outcome: Adequate for Discharge  7/26/2024 1006 by Cathy Greco RN  Outcome: Adequate for Discharge  Goal: Refrain from isolation  Description: Interventions:  - Develop a trusting relationship   - Encourage socialization   7/26/2024 1006 by Cathy Greco RN  Outcome: Adequate for Discharge  7/26/2024 1006 by Cathy Greco RN  Outcome: Adequate for Discharge  Goal: Refrain from self-neglect  7/26/2024 1006 by Cathy Greco RN  Outcome: Adequate for Discharge  7/26/2024 1006 by Cathy Greco RN  Outcome: Adequate for Discharge  Goal: Attend and participate in unit activities, including therapeutic, recreational, and educational groups  Description: Interventions:  - Provide therapeutic and educational activities daily, encourage attendance and participation, and document same in the medical record   7/26/2024 1006 by Cathy Greco  RN  Outcome: Adequate for Discharge  7/26/2024 1006 by Cathy Greco RN  Outcome: Adequate for Discharge  Goal: Complete daily ADLs, including personal hygiene independently, as able  Description: Interventions:  - Observe, teach, and assist patient with ADLS  -  Monitor and promote a balance of rest/activity, with adequate nutrition and elimination   7/26/2024 1006 by Cathy Greco RN  Outcome: Adequate for Discharge  7/26/2024 1006 by Cathy Greco RN  Outcome: Adequate for Discharge     Problem: Anxiety  Goal: Anxiety is at manageable level  Description: Interventions:  - Assess and monitor patient's anxiety level.   - Monitor for signs and symptoms (heart palpitations, chest pain, shortness of breath, headaches, nausea, feeling jumpy, restlessness, irritable, apprehensive).   - Collaborate with interdisciplinary team and initiate plan and interventions as ordered.  - [place] patient to unit/surroundings  - Explain treatment plan  - Encourage participation in care  - Encourage verbalization of concerns/fears  - Identify coping mechanisms  - Assist in developing anxiety-reducing skills  - Administer/offer alternative therapies  - Limit or eliminate stimulants  7/26/2024 1006 by Cathy Greco RN  Outcome: Adequate for Discharge  7/26/2024 1006 by Cathy Greco RN  Outcome: Adequate for Discharge     Problem: DISCHARGE PLANNING - CARE MANAGEMENT  Goal: Discharge to post-acute care or home with appropriate resources  Description: INTERVENTIONS:  - Conduct assessment to determine patient/family and health care team treatment goals, and need for post-acute services based on payer coverage, community resources, and patient preferences, and barriers to discharge  - Address psychosocial, clinical, and financial barriers to discharge as identified in assessment in conjunction with the patient/family and health care team  - Arrange appropriate level of post-acute services according to patient’s   needs and preference and payer  coverage in collaboration with the physician and health care team  - Communicate with and update the patient/family, physician, and health care team regarding progress on the discharge plan  - Arrange appropriate transportation to post-acute venues  7/26/2024 1006 by Cathy Greco RN  Outcome: Adequate for Discharge  7/26/2024 1006 by Cathy Greco RN  Outcome: Adequate for Discharge

## 2024-07-26 NOTE — CASE MANAGEMENT
Return to work letter provided at pt's request. Letter faxed to [facility]. CM notified pt's  No of letter on unit for  and letter faxed to office.

## 2024-07-26 NOTE — PLAN OF CARE
Problem: DISCHARGE PLANNING - CARE MANAGEMENT  Goal: Discharge to post-acute care or home with appropriate resources  Description: INTERVENTIONS:  - Conduct assessment to determine patient/family and health care team treatment goals, and need for post-acute services based on payer coverage, community resources, and patient preferences, and barriers to discharge  - Address psychosocial, clinical, and financial barriers to discharge as identified in assessment in conjunction with the patient/family and health care team  - Arrange appropriate level of post-acute services according to patient’s   needs and preference and payer coverage in collaboration with the physician and health care team  - Communicate with and update the patient/family, physician, and health care team regarding progress on the discharge plan  - Arrange appropriate transportation to post-acute venues  Outcome: Adequate for Discharge   Discharge back to [facility] housing, [facility]  to provide transport, OP MH treatment with [facility], DC meds to pt's preferred pharmacy

## 2024-07-26 NOTE — NURSING NOTE
Patient cooperative with care, visible in milieu, anticipating discharge today, no complaints voiced. Denied anxiety/depression, denied SI/HI, AH/VH, safety precautions maintained. Discharge summary reviewed with patient, patient verbalized understanding, no questions or concerns voiced.

## 2024-07-26 NOTE — NURSING NOTE
Patient discharged off unit with PCA to [facility]. discharge paperwork left behind. No from [facility] stated she would pick it up but would also like a doctor's note for her care here, call forwarded to CM, CM made aware of request.

## 2024-07-26 NOTE — CASE MANAGEMENT
Call made to Pepper Marshall, [facility]  tle# [phone]; left voicemail notifying of pt's d/c.

## 2024-07-26 NOTE — PROGRESS NOTES
07/26/24 1100   Activity/Group Checklist   Group Self Esteem  (How to improve your self esteem)   Attendance Attended   Attendance Duration (min) 16-30   Interactions Interacted appropriately  (Patient shared that her grandchilden are her motivator)   Affect/Mood Appropriate   Goals Achieved Identified feelings;Able to engage in interactions;Able to listen to others;Able to self-disclose;Able to recieve feedback;Able to give feedback to another

## 2024-07-26 NOTE — DISCHARGE INSTR - APPOINTMENTS
Arlene our Behavioral Health Nurse Navigator, will be calling you after your discharge, on the phone number that you provided.  She will be available as an additional support, if needed.   If you wish to speak with Arlene, you may contact her at [phone].

## 2024-07-26 NOTE — DISCHARGE INSTR - OTHER ORDERS
You are being discharged to  [facility] apartment: [address], [place] [address]   If you are unable to deal with your distressed mood alone please contact [facility] Crisis # [phone], dial 911 or go to the nearest emergency center.     HOW TO GET SUBSTANCE ABUSE HELP:  If you or someone you know has a drug or alcohol problem, there is help:  [facility] Abuse [facility]: [phone]  [facility]: [phone]  An assessment is the first step.   In addition to those listed there are other programs available in the area but assessment is best to determine an appropriate level of care.  If you DO NOT have Medical Assistance (MA) or Private Insurance, an assessment can be scheduled at one of these providers:  [facility]  [address] [place] [address]  [phone]   [address] [address]  [phone]   [facility]  [address] [place] [address] [place] [address]  [phone]   [facility]  [address] [place] [address]  [phone]   [facility]  [address] [place] [address]  [phone]   [facility]  [address] [place] [address]  [phone]   [facility]  [address], [place] [address]  [phone]     If you HAVE Medical Assistance, an assessment can be scheduled at one of these providers:  [facility]  [address] [place] [address]  [phone]   [facility]  [address] [place] [address]  [phone]   [facility]  [address], [place] [address]  [phone]  [address] [place] [address], [address], [place] [address] [phone]   [address] [place] [address]  [phone]   [facility] [place] [address] [facility] [place] [address]   [phone]   [facility] ([facility])  [address] [place] [address]  [phone]   [facility]  [address] [place] [address]  [phone]   [facility]  [address] [place] [address]  [phone]   [facility]  [address] [place] [address]  [phone]   [facility]  [address], [place] [address]  [phone]     If you HAVE Private Insurance, an assessment can be scheduled at one of these providers.  Please contact these Providers to determine if they are in your network plan:  [facility]A Intake [facility]  [address], [place] [address]  [phone]   [facility]  [address] [place] [address]  [phone]   [facility]  [address] [place] [address]  [phone]   [facility] ([facility])  [address] [place] [address]  [phone]   [facility]  [address] [place] [address]  [phone]   [facility]  [address], [place] [address]  [phone]

## 2024-07-29 ENCOUNTER — PATIENT OUTREACH (OUTPATIENT)
Dept: CASE MANAGEMENT | Facility: OTHER | Age: 59
End: 2024-07-29

## 2024-07-29 DIAGNOSIS — Z59.89: Primary | ICD-10-CM

## 2024-07-29 SDOH — ECONOMIC STABILITY - INCOME SECURITY: OTHER PROBLEMS RELATED TO HOUSING AND ECONOMIC CIRCUMSTANCES: Z59.89

## 2024-07-29 NOTE — PROGRESS NOTES
ADT Notification received.  Patient discharged from [facility] on 7/26.   team noted patient will be transported back to [facility] through the [facility] .  Patient will also participate in OP MH Treatment through [facility].    [facility] Care Managers are listed on patient's consent form.    Call placed to patient to introduce role of OP SWCM and assess needs (patient was initially referred to OP SWCM by [facility]).  Spoke with patient who confirmed she currently lives [facility]; has been with the program for over 1 year.  States she does not drive at this time as she needs to pay a fine in order to get license back.  Patient uses uber or bus to get to appts/work.  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  Patient's goal is to get her Respiratory License back.    Patient also hopes to find her own apartment.  She is not actively on any housing waitlists.  Patient requested help with this process and agreed for [facility] to assist with housing applications in [facility], and [facility].  Referral placed for CMOC.    Patient is actively receiving MH services with [facility], which meets with her weekly.  No helped patient apply for SSDI benefits last year but patient has not received update since applying.  Encouraged patient to call  Office to check status of application.  Patient also has MH appt scheduled for 9/9 through [facility].  Patient denied any current suicidal thoughts/plans but confirmed she has had suicidal thoughts in the past and went to the hospital.  Patient confirmed having crisis information if needed.  Patient states she has support from her dtr who lives locally as well.    Patient states insurance is commercial plan through her employer; requested she send a copy of insurance cards via "[employer] as most recent insurance  card on file was an MA plan.  Patient states she no longer has this plan as she is over income.  Patient states medications are expensive with current insurance.  Patient denied having any additional benefits/needs at this time.    Will follow.

## 2024-08-01 ENCOUNTER — TELEPHONE (OUTPATIENT)
Dept: PSYCHIATRY | Facility: CLINIC | Age: 59
End: 2024-08-01

## 2024-08-01 NOTE — TELEPHONE ENCOUNTER
One week follow up call for New Patient appointment with Darion Rahman on 9/9/24  was made on 8/1/24. Writer informed patient of New Patient paperwork needing to be completed 5 days prior to the appointment. Writer confirmed paperwork has been sent via [employer].    Appointment was made on: 7/25/24

## 2024-08-02 ENCOUNTER — PATIENT OUTREACH (OUTPATIENT)
Dept: FAMILY MEDICINE CLINIC | Facility: CLINIC | Age: 59
End: 2024-08-02

## 2024-08-02 NOTE — PROGRESS NOTES
CMOC attempted outreach to pt regarding transportation & housing assistance. CMOC left pt a message to return call and sent an email. CMOC communicated by email that CMOC have been assigned to assist pt with applying for Transportation services & housing. [facility] would like to schedule a time & day for 8/6 between 10am-2pm to do the application by phone    Will F/U by 8/6.

## 2024-08-06 ENCOUNTER — PATIENT OUTREACH (OUTPATIENT)
Dept: FAMILY MEDICINE CLINIC | Facility: CLINIC | Age: 59
End: 2024-08-06

## 2024-08-06 NOTE — PROGRESS NOTES
CMOC 2nd attempted outreach to pt. Pt mailbox was full and could not leave messages. CMOC will send unable to reach letter.

## 2024-08-06 NOTE — LETTER
08/06/24    Dear Herminia De Paz,    I am a Community Health Worker with [place] [address] [address].  I have made several attempts to call you by phone.  It is important that you contact me back at [phone], so that I can assist with your care needs.     Sincerely,       ELLEN DinhEd

## 2024-08-08 ENCOUNTER — PATIENT OUTREACH (OUTPATIENT)
Dept: CASE MANAGEMENT | Facility: OTHER | Age: 59
End: 2024-08-08

## 2024-08-08 NOTE — PROGRESS NOTES
Updates received from CM who attempted to reach patient to assist with transportation and housing applications; CMOC was unable to reach and letter was sent.    Call placed to patient to check status.  No answer and unable to leave voicemail as mailbox is full.  Will attempt additional outreach at later date.

## 2024-08-14 ENCOUNTER — PATIENT OUTREACH (OUTPATIENT)
Dept: CASE MANAGEMENT | Facility: OTHER | Age: 59
End: 2024-08-14

## 2024-08-14 NOTE — PROGRESS NOTES
Chart reviewed.  Patient presented to [facility] for behavioral health admission.  Patient was having suicidal ideations.  Patient signed 201 for  admission.  Will follow.

## 2024-08-20 ENCOUNTER — PATIENT OUTREACH (OUTPATIENT)
Dept: CASE MANAGEMENT | Facility: OTHER | Age: 59
End: 2024-08-20

## 2024-08-20 NOTE — PROGRESS NOTES
Case hand-off provided to OP SWCM Cathy; added this OP SWCM to care team for ongoing follow-up needs.  Removed self from care team.

## 2024-08-22 ENCOUNTER — PATIENT OUTREACH (OUTPATIENT)
Dept: CASE MANAGEMENT | Facility: HOSPITAL | Age: 59
End: 2024-08-22

## 2024-08-22 ENCOUNTER — PATIENT OUTREACH (OUTPATIENT)
Dept: CASE MANAGEMENT | Facility: OTHER | Age: 59
End: 2024-08-22

## 2024-08-22 NOTE — PROGRESS NOTES
CMOC received inbasket on pt. CMOC did chart review. CMOC sent pt unable to reach letter and no response since 8/6. CMOC will close pt.

## 2024-08-22 NOTE — PROGRESS NOTES
Received In-basket message that CMOC will be closing referral.  Chart reviewed and noted admission to [facility] on 8/8/24 and discharge to inpatient rehab for substance abuse on 8/20/24.  Will continue to follow and be available to provide support.

## 2024-09-05 ENCOUNTER — TELEPHONE (OUTPATIENT)
Dept: PSYCHIATRY | Facility: CLINIC | Age: 59
End: 2024-09-05

## 2024-09-09 ENCOUNTER — TELEPHONE (OUTPATIENT)
Dept: PSYCHIATRY | Facility: CLINIC | Age: 59
End: 2024-09-09

## 2024-09-09 NOTE — TELEPHONE ENCOUNTER
Writer left voicemail for client to call back if she would like to reschedule her new patient appointment (also hosp. Discharge) from 9/9/24 due to missed appointment today.

## 2024-09-23 ENCOUNTER — PATIENT OUTREACH (OUTPATIENT)
Dept: CASE MANAGEMENT | Facility: OTHER | Age: 59
End: 2024-09-23

## 2024-09-23 ENCOUNTER — TELEPHONE (OUTPATIENT)
Dept: OBGYN CLINIC | Facility: CLINIC | Age: 59
End: 2024-09-23

## 2024-09-23 NOTE — PROGRESS NOTES
JULIA ENRIQUEZ placed call to patient to offer psychosocial support and message left.  Will await return call and remain available to provide support.

## 2024-09-25 ENCOUNTER — HOSPITAL ENCOUNTER (EMERGENCY)
Facility: HOSPITAL | Age: 59
Discharge: HOME | End: 2024-09-25
Attending: EMERGENCY MEDICINE
Payer: COMMERCIAL

## 2024-09-25 ENCOUNTER — APPOINTMENT (EMERGENCY)
Dept: RADIOLOGY | Facility: HOSPITAL | Age: 59
End: 2024-09-25
Payer: COMMERCIAL

## 2024-09-25 VITALS
HEIGHT: 63 IN | HEART RATE: 97 BPM | DIASTOLIC BLOOD PRESSURE: 86 MMHG | RESPIRATION RATE: 22 BRPM | WEIGHT: 133.16 LBS | OXYGEN SATURATION: 95 % | BODY MASS INDEX: 23.59 KG/M2 | SYSTOLIC BLOOD PRESSURE: 148 MMHG | TEMPERATURE: 97.9 F

## 2024-09-25 DIAGNOSIS — B34.9 VIRAL SYNDROME: ICD-10-CM

## 2024-09-25 DIAGNOSIS — R05.9 COUGH: Primary | ICD-10-CM

## 2024-09-25 LAB
FLUAV AG UPPER RESP QL IA.RAPID: NEGATIVE
FLUBV AG UPPER RESP QL IA.RAPID: NEGATIVE
SARS-COV+SARS-COV-2 AG RESP QL IA.RAPID: NEGATIVE

## 2024-09-25 PROCEDURE — 71046 X-RAY EXAM CHEST 2 VIEWS: CPT

## 2024-09-25 PROCEDURE — 87811 SARS-COV-2 COVID19 W/OPTIC: CPT | Performed by: PHYSICIAN ASSISTANT

## 2024-09-25 PROCEDURE — 96372 THER/PROPH/DIAG INJ SC/IM: CPT

## 2024-09-25 PROCEDURE — 99284 EMERGENCY DEPT VISIT MOD MDM: CPT | Performed by: PHYSICIAN ASSISTANT

## 2024-09-25 PROCEDURE — 87804 INFLUENZA ASSAY W/OPTIC: CPT | Performed by: PHYSICIAN ASSISTANT

## 2024-09-25 PROCEDURE — 99283 EMERGENCY DEPT VISIT LOW MDM: CPT

## 2024-09-25 RX ORDER — BENZONATATE 100 MG/1
100 CAPSULE ORAL 3 TIMES DAILY PRN
Qty: 21 CAPSULE | Refills: 0 | Status: SHIPPED | OUTPATIENT
Start: 2024-09-25 | End: 2024-09-25

## 2024-09-25 RX ORDER — BENZONATATE 100 MG/1
100 CAPSULE ORAL 3 TIMES DAILY PRN
Qty: 21 CAPSULE | Refills: 0 | Status: SHIPPED | OUTPATIENT
Start: 2024-09-25

## 2024-09-25 RX ORDER — IBUPROFEN 400 MG/1
400 TABLET, FILM COATED ORAL EVERY 6 HOURS PRN
Qty: 20 TABLET | Refills: 0 | Status: SHIPPED | OUTPATIENT
Start: 2024-09-25

## 2024-09-25 RX ORDER — KETOROLAC TROMETHAMINE 30 MG/ML
15 INJECTION, SOLUTION INTRAMUSCULAR; INTRAVENOUS ONCE
Status: COMPLETED | OUTPATIENT
Start: 2024-09-25 | End: 2024-09-25

## 2024-09-25 RX ORDER — IBUPROFEN 400 MG/1
400 TABLET, FILM COATED ORAL EVERY 6 HOURS PRN
Qty: 20 TABLET | Refills: 0 | Status: SHIPPED | OUTPATIENT
Start: 2024-09-25 | End: 2024-09-25

## 2024-09-25 RX ADMIN — KETOROLAC TROMETHAMINE 15 MG: 30 INJECTION, SOLUTION INTRAMUSCULAR; INTRAVENOUS at 10:57

## 2024-09-25 NOTE — ED PROVIDER NOTES
1. Cough    2. Viral syndrome      ED Disposition       ED Disposition   Discharge    Condition   Stable    Date/Time   Wed Sep 25, 2024 11:59 AM    Comment   Herminia De Paz discharge to home/self care.                   Assessment & Plan       Medical Decision Making  Two days of cough/congestion, fevers although these have improved, chills. Notes headaches as well since onset - history of migraine although these are less intense, frontal, no light sensitivity/nausea. Sick contacts noted with similar. On exam she is well appearing, nontoxic, no acute distress. Lungs CTAB. XR chest clear on my initial read. COVID19/flu testing negative. Viral etiology favored - will d/c with supportive care. Plenty of fluids encouraged. Return indications reviewed.     Amount and/or Complexity of Data Reviewed  Labs: ordered.  Radiology: ordered.    Risk  OTC drugs.  Prescription drug management.                     Medications   ketorolac (TORADOL) injection 15 mg (15 mg Intramuscular Given 9/25/24 2713)       History of Present Illness       Herminia is a 58 yo F presenting with 2 days of cough, congestion, sore throat, headaches, chills. Reports fever 2 days ago but none since. Reports sick contacts at recent rehab admission. No chest pain or shortness of breath associated. No N/V/D.       History provided by:  Patient   used: No        Review of Systems   Constitutional:  Positive for chills, fatigue and fever.   HENT:  Positive for congestion. Negative for ear pain, rhinorrhea and sore throat.    Eyes:  Negative for pain and visual disturbance.   Respiratory:  Positive for cough. Negative for chest tightness, shortness of breath and wheezing.    Cardiovascular:  Negative for chest pain and palpitations.   Gastrointestinal:  Negative for abdominal pain, nausea and vomiting.   Genitourinary:  Negative for dysuria, frequency and urgency.   Musculoskeletal:  Positive for myalgias. Negative for back pain,  neck pain and neck stiffness.   Skin:  Negative for rash and wound.   Neurological:  Positive for headaches. Negative for dizziness, weakness, light-headedness and numbness.           Objective     ED Triage Vitals [09/25/24 1036]   Temperature Pulse Blood Pressure Respirations SpO2 Patient Position - Orthostatic VS   97.9 °F (36.6 °C) 97 148/86 22 95 % Sitting      Temp Source Heart Rate Source BP Location FiO2 (%) Pain Score    Oral Monitor Left arm -- 10 - Worst Possible Pain        Physical Exam  Vitals and nursing note reviewed.   Constitutional:       General: She is not in acute distress.     Appearance: She is well-developed. She is not diaphoretic.   HENT:      Head: Normocephalic and atraumatic.      Right Ear: Tympanic membrane, ear canal and external ear normal.      Left Ear: Tympanic membrane, ear canal and external ear normal.      Nose: Nose normal.      Mouth/Throat:      Mouth: Oropharynx is clear and moist.      Pharynx: No oropharyngeal exudate.   Eyes:      General:         Right eye: No discharge.         Left eye: No discharge.      Extraocular Movements: EOM normal.      Conjunctiva/sclera: Conjunctivae normal.      Pupils: Pupils are equal, round, and reactive to light.   Cardiovascular:      Rate and Rhythm: Normal rate and regular rhythm.      Heart sounds: Normal heart sounds. No murmur heard.     No friction rub. No gallop.   Pulmonary:      Effort: Pulmonary effort is normal. No respiratory distress.      Breath sounds: Normal breath sounds. No wheezing or rales.   Abdominal:      General: Bowel sounds are normal. There is no distension.      Palpations: Abdomen is soft.      Tenderness: There is no abdominal tenderness. There is no guarding.   Musculoskeletal:      Cervical back: Normal range of motion and neck supple.   Lymphadenopathy:      Cervical: No cervical adenopathy.   Skin:     General: Skin is warm and dry.      Capillary Refill: Capillary refill takes less than 2 seconds.       Coloration: Skin is not pale.      Findings: No erythema or rash.   Neurological:      Mental Status: She is alert and oriented to person, place, and time.      Motor: No abnormal muscle tone.      Coordination: Coordination normal.   Psychiatric:         Mood and Affect: Mood and affect normal.         Behavior: Behavior normal.         Thought Content: Thought content normal.         Judgment: Judgment normal.         Labs Reviewed   COVID-19/INFLUENZA A/B RAPID ANTIGEN (30 MIN.TAT) - Normal       Result Value    SARS COV Rapid Antigen Negative      Influenza A Rapid Antigen Negative      Influenza B Rapid Antigen Negative      Narrative:     This test has been performed using the [employer] Kathrine 2 FLU+SARS Antigen test under the Emergency Use Authorization (EUA). This test has been validated by the  and verified by the performing laboratory. The Kathrine uses lateral flow immunofluorescent sandwich assay to detect SARS-COV, Influenza A and Influenza B Antigen.     The Quidel Kathrine 2 SARS Antigen test does not differentiate between SARS-CoV and SARS-CoV-2.     Negative results are presumptive and may be confirmed with a molecular assay, if necessary, for patient management. Negative results do not rule out SARS-CoV-2 or influenza infection and should not be used as the sole basis for treatment or patient management decisions. A negative test result may occur if the level of antigen in a sample is below the limit of detection of this test.     Positive results are indicative of the presence of viral antigens, but do not rule out bacterial infection or co-infection with other viruses.     All test results should be used as an adjunct to clinical observations and other information available to the provider.    FOR PEDIATRIC PATIENTS - copy/paste COVID Guidelines URL to browser: [email or web address]     XR chest 2 views    (Results Pending)        Procedures    ED Medication and Procedure Management   Prior to Admission Medications   Prescriptions Last Dose Informant Patient Reported? Taking?   ARIPiprazole (ABILIFY) 20 MG tablet   No No   Sig: Take 1 tablet (20 mg total) by mouth daily   Butalbital-APAP-Caffeine (Fioricet) -40 MG CAPS   No No   Sig: Take 2 tablets by mouth every 4 (four) hours as needed (max 8qd) for up to 20 doses   Multiple Vitamin (Multivitamin Adult) TABS   No No   Sig: Take 1 pill daily   albuterol (PROVENTIL HFA,VENTOLIN HFA) 90 mcg/act inhaler   Yes No   buPROPion (WELLBUTRIN XL) 150 mg 24 hr tablet   No No   Sig: Take 1 tablet (150 mg total) by mouth daily   budesonide-formoterol (SYMBICORT) 160-4.5 mcg/act inhaler   Yes No   Sig: Inhale 2 puffs 2 (two) times a day Rinse mouth after use.   budesonide-formoterol (Symbicort) 160-4.5 mcg/act inhaler   No No   Sig: Inhale 2 puffs 2 (two) times a day At 8AM and HS.Rinse mouth after use.   ergocalciferol (VITAMIN D2) 50,000 units   No No   Sig: Take 1 capsule (50,000 Units total) by mouth once a week for 7 doses   folic acid (FOLVITE) 1 mg tablet   No No   Sig: Take 1 tablet (1 mg total) by mouth daily   ketorolac (TORADOL) 10 mg tablet   No No   Sig: Take 1 tablet (10 mg total) by mouth every 6 (six) hours as needed for moderate pain   nicotine polacrilex (NICORETTE) 4 mg gum   No No   Sig: Chew 1 each (4 mg total) as needed for smoking cessation   sertraline (ZOLOFT) 100 mg tablet   No No   Sig: Take 2 tablets (200 mg total) by mouth daily   topiramate (TOPAMAX) 25 mg tablet   No No   Sig: TAKE 1 TABLET BY MOUTH 2X DAILY @8AM-8PM (MIGRAINE) BIMAL   vitamin B-12 (CYANOCOBALAMIN) 250 MCG tablet   No No   Sig: Take 4 tablets (1,000 mcg total) by mouth daily      Facility-Administered Medications: None     Patient's Medications   Discharge Prescriptions    BENZONATATE (TESSALON PERLES) 100 MG CAPSULE    Take 1 capsule (100 mg total) by mouth 3 (three) times a day as needed for  cough       Start Date: 9/25/2024 End Date: --       Order Dose: 100 mg       Quantity: 21 capsule    Refills: 0    DEXTROMETHORPHAN-GUAIFENESIN (MUCINEX DM)  MG PER 12 HR TABLET    Take 1 tablet by mouth every 12 (twelve) hours as needed (Cough, congestion)       Start Date: 9/25/2024 End Date: --       Order Dose: 1 tablet       Quantity: 14 tablet    Refills: 0    IBUPROFEN (MOTRIN) 400 MG TABLET    Take 1 tablet (400 mg total) by mouth every 6 (six) hours as needed for headaches       Start Date: 9/25/2024 End Date: --       Order Dose: 400 mg       Quantity: 20 tablet    Refills: 0     No discharge procedures on file.     Dl Lopez PA-C  09/25/24 0449

## 2024-09-25 NOTE — Clinical Note
Herminia De Paz was seen and treated in our emergency department on 9/25/2024.                Diagnosis:     Herminia  .    She may return on this date: 09/30/2024         If you have any questions or concerns, please don't hesitate to call.      Dl Lopez PA-C    ______________________________           _______________          _______________  Hospital Representative                              Date                                Time

## 2024-09-25 NOTE — DISCHARGE INSTRUCTIONS
Please refer to the attached information for strict return instructions. If symptoms worsen or new symptoms develop please return to the ER.

## 2024-09-26 ENCOUNTER — APPOINTMENT (EMERGENCY)
Dept: RADIOLOGY | Facility: HOSPITAL | Age: 59
End: 2024-09-26
Payer: COMMERCIAL

## 2024-09-26 ENCOUNTER — HOSPITAL ENCOUNTER (EMERGENCY)
Facility: HOSPITAL | Age: 59
Discharge: HOME | End: 2024-09-26
Attending: EMERGENCY MEDICINE
Payer: COMMERCIAL

## 2024-09-26 VITALS
SYSTOLIC BLOOD PRESSURE: 131 MMHG | HEART RATE: 94 BPM | OXYGEN SATURATION: 91 % | RESPIRATION RATE: 20 BRPM | TEMPERATURE: 98.8 F | DIASTOLIC BLOOD PRESSURE: 72 MMHG

## 2024-09-26 DIAGNOSIS — J18.9 PNEUMONIA: Primary | ICD-10-CM

## 2024-09-26 DIAGNOSIS — R05.9 COUGH: Primary | ICD-10-CM

## 2024-09-26 LAB
ALBUMIN SERPL BCG-MCNC: 4.3 G/DL (ref 3.5–5)
ALP SERPL-CCNC: 75 U/L (ref 34–104)
ALT SERPL W P-5'-P-CCNC: 17 U/L (ref 7–52)
ANION GAP SERPL CALCULATED.3IONS-SCNC: 10 MMOL/L (ref 4–13)
AST SERPL W P-5'-P-CCNC: 19 U/L (ref 13–39)
ATRIAL RATE: 100 BPM
BASOPHILS # BLD AUTO: 0.02 THOUSANDS/ΜL (ref 0–0.1)
BASOPHILS NFR BLD AUTO: 0 % (ref 0–1)
BILIRUB SERPL-MCNC: 0.37 MG/DL (ref 0.2–1)
BUN SERPL-MCNC: 13 MG/DL (ref 5–25)
CALCIUM SERPL-MCNC: 9.3 MG/DL (ref 8.4–10.2)
CHLORIDE SERPL-SCNC: 101 MMOL/L (ref 96–108)
CO2 SERPL-SCNC: 22 MMOL/L (ref 21–32)
CREAT SERPL-MCNC: 0.64 MG/DL (ref 0.6–1.3)
EOSINOPHIL # BLD AUTO: 0.04 THOUSAND/ΜL (ref 0–0.61)
EOSINOPHIL NFR BLD AUTO: 1 % (ref 0–6)
ERYTHROCYTE [DISTWIDTH] IN BLOOD BY AUTOMATED COUNT: 13.2 % (ref 11.6–15.1)
GFR SERPL CREATININE-BSD FRML MDRD: 97 ML/MIN/1.73SQ M
GLUCOSE SERPL-MCNC: 92 MG/DL (ref 65–140)
HCT VFR BLD AUTO: 37.2 % (ref 34.8–46.1)
HGB BLD-MCNC: 12.3 G/DL (ref 11.5–15.4)
IMM GRANULOCYTES # BLD AUTO: 0.02 THOUSAND/UL (ref 0–0.2)
IMM GRANULOCYTES NFR BLD AUTO: 0 % (ref 0–2)
LYMPHOCYTES # BLD AUTO: 1 THOUSANDS/ΜL (ref 0.6–4.47)
LYMPHOCYTES NFR BLD AUTO: 19 % (ref 14–44)
MCH RBC QN AUTO: 30 PG (ref 26.8–34.3)
MCHC RBC AUTO-ENTMCNC: 33.1 G/DL (ref 31.4–37.4)
MCV RBC AUTO: 91 FL (ref 82–98)
MONOCYTES # BLD AUTO: 0.44 THOUSAND/ΜL (ref 0.17–1.22)
MONOCYTES NFR BLD AUTO: 8 % (ref 4–12)
NEUTROPHILS # BLD AUTO: 3.9 THOUSANDS/ΜL (ref 1.85–7.62)
NEUTS SEG NFR BLD AUTO: 72 % (ref 43–75)
NRBC BLD AUTO-RTO: 0 /100 WBCS
P AXIS: 69 DEGREES
PLATELET # BLD AUTO: 282 THOUSANDS/UL (ref 149–390)
PMV BLD AUTO: 9.3 FL (ref 8.9–12.7)
POTASSIUM SERPL-SCNC: 3.8 MMOL/L (ref 3.5–5.3)
PR INTERVAL: 134 MS
PROT SERPL-MCNC: 7.2 G/DL (ref 6.4–8.4)
QRS AXIS: 63 DEGREES
QRSD INTERVAL: 76 MS
QT INTERVAL: 312 MS
QTC INTERVAL: 402 MS
RBC # BLD AUTO: 4.1 MILLION/UL (ref 3.81–5.12)
SODIUM SERPL-SCNC: 133 MMOL/L (ref 135–147)
T WAVE AXIS: 78 DEGREES
VENTRICULAR RATE: 100 BPM
WBC # BLD AUTO: 5.42 THOUSAND/UL (ref 4.31–10.16)

## 2024-09-26 PROCEDURE — 80053 COMPREHEN METABOLIC PANEL: CPT | Performed by: EMERGENCY MEDICINE

## 2024-09-26 PROCEDURE — 71046 X-RAY EXAM CHEST 2 VIEWS: CPT

## 2024-09-26 PROCEDURE — 94640 AIRWAY INHALATION TREATMENT: CPT

## 2024-09-26 PROCEDURE — 93010 ELECTROCARDIOGRAM REPORT: CPT | Performed by: STUDENT IN AN ORGANIZED HEALTH CARE EDUCATION/TRAINING PROGRAM

## 2024-09-26 PROCEDURE — 99284 EMERGENCY DEPT VISIT MOD MDM: CPT | Performed by: EMERGENCY MEDICINE

## 2024-09-26 PROCEDURE — 96365 THER/PROPH/DIAG IV INF INIT: CPT

## 2024-09-26 PROCEDURE — 87040 BLOOD CULTURE FOR BACTERIA: CPT | Performed by: EMERGENCY MEDICINE

## 2024-09-26 PROCEDURE — 85025 COMPLETE CBC W/AUTO DIFF WBC: CPT | Performed by: EMERGENCY MEDICINE

## 2024-09-26 PROCEDURE — 36415 COLL VENOUS BLD VENIPUNCTURE: CPT | Performed by: EMERGENCY MEDICINE

## 2024-09-26 PROCEDURE — 99285 EMERGENCY DEPT VISIT HI MDM: CPT

## 2024-09-26 PROCEDURE — 93005 ELECTROCARDIOGRAM TRACING: CPT

## 2024-09-26 RX ORDER — IPRATROPIUM BROMIDE AND ALBUTEROL SULFATE 2.5; .5 MG/3ML; MG/3ML
3 SOLUTION RESPIRATORY (INHALATION) ONCE
Status: COMPLETED | OUTPATIENT
Start: 2024-09-26 | End: 2024-09-26

## 2024-09-26 RX ORDER — DEXTROMETHORPHAN HYDROBROMIDE AND PROMETHAZINE HYDROCHLORIDE 15; 6.25 MG/5ML; MG/5ML
5 SYRUP ORAL 4 TIMES DAILY PRN
Qty: 180 ML | Refills: 0 | Status: SHIPPED | OUTPATIENT
Start: 2024-09-26

## 2024-09-26 RX ORDER — ALBUTEROL SULFATE 90 UG/1
2 INHALANT RESPIRATORY (INHALATION) ONCE
Status: COMPLETED | OUTPATIENT
Start: 2024-09-26 | End: 2024-09-26

## 2024-09-26 RX ORDER — DOXYCYCLINE 100 MG/1
100 CAPSULE ORAL 2 TIMES DAILY
Qty: 14 CAPSULE | Refills: 0 | Status: SHIPPED | OUTPATIENT
Start: 2024-09-26 | End: 2024-10-03

## 2024-09-26 RX ORDER — AZITHROMYCIN 250 MG/1
500 TABLET, FILM COATED ORAL ONCE
Status: COMPLETED | OUTPATIENT
Start: 2024-09-26 | End: 2024-09-26

## 2024-09-26 RX ORDER — PREDNISONE 20 MG/1
60 TABLET ORAL ONCE
Status: COMPLETED | OUTPATIENT
Start: 2024-09-26 | End: 2024-09-26

## 2024-09-26 RX ORDER — PREDNISONE 20 MG/1
40 TABLET ORAL DAILY
Qty: 10 TABLET | Refills: 0 | Status: SHIPPED | OUTPATIENT
Start: 2024-09-26 | End: 2024-10-01

## 2024-09-26 RX ADMIN — AZITHROMYCIN DIHYDRATE 500 MG: 250 TABLET ORAL at 21:12

## 2024-09-26 RX ADMIN — DEXTROSE 1000 MG: 50 INJECTION, SOLUTION INTRAVENOUS at 21:14

## 2024-09-26 RX ADMIN — SODIUM CHLORIDE 1000 ML: 0.9 INJECTION, SOLUTION INTRAVENOUS at 21:14

## 2024-09-26 RX ADMIN — ALBUTEROL SULFATE 2 PUFF: 90 AEROSOL, METERED RESPIRATORY (INHALATION) at 23:46

## 2024-09-26 RX ADMIN — IPRATROPIUM BROMIDE AND ALBUTEROL SULFATE 3 ML: 2.5; .5 SOLUTION RESPIRATORY (INHALATION) at 21:13

## 2024-09-26 RX ADMIN — PREDNISONE 60 MG: 20 TABLET ORAL at 21:12

## 2024-09-27 NOTE — ED PROVIDER NOTES
Pt Name: Herminia De Paz  MRN: [id number]  Birthdate 1965  Age/Sex: 59 y.o. female  Date of evaluation: 9/26/2024  PCP: Andre Mitchell DO        FINAL IMPRESSION    Final diagnoses:   Pneumonia         DISPOSITION/PLAN      Time reflects when diagnosis was documented in both MDM as applicable and the Disposition within this note       Time User Action Codes Description Comment    9/26/2024 11:35 PM Joanna Talavera Add [J18.9] Pneumonia           ED Disposition       ED Disposition   Discharge    Condition   Stable    Date/Time   u Sep 26, 2024 11:35 PM    Comment   Herminia De Paz discharge to home/self care.                   Follow-up Information       Follow up With Specialties Details Why Contact Info Additional Information    Yinka Mitchell DO Family Medicine Schedule an appointment as soon as possible for a visit   [address] [place] [phone]       [facility] Department Emergency Medicine  As needed, If symptoms worsen [address] [place] [phone] [facility], [address] [place] [address], [facility]              PATIENT REFERRED TO:    Yinka Mitchell DO  [address] [place] [address]  [phone]    Schedule an appointment as soon as possible for a visit       [facility]  [address] [place], [place] [id number]    As needed, If symptoms worsen      DISCHARGE MEDICATIONS:    Discharge Medication List as of 9/26/2024 11:36 PM        START taking these medications    Details   predniSONE 20 mg tablet Take 2 tablets (40 mg total) by mouth daily for 5 days, Starting Thu 9/26/2024, Until Tue 10/1/2024, Normal      promethazine-dextromethorphan (PHENERGAN-DM) 6.25-15 mg/5 mL oral syrup Take 5 mL by mouth 4 (four) times a day as needed for cough, Starting Thu 9/26/2024, Normal           CONTINUE these  medications which have NOT CHANGED    Details   albuterol (PROVENTIL HFA,VENTOLIN HFA) 90 mcg/act inhaler Historical Med      ARIPiprazole (ABILIFY) 20 MG tablet Take 1 tablet (20 mg total) by mouth daily, Starting Thu 7/25/2024, Until Mon 9/23/2024, Normal      benzonatate (TESSALON PERLES) 100 mg capsule Take 1 capsule (100 mg total) by mouth 3 (three) times a day as needed for cough, Starting Wed 9/25/2024, Normal      !! budesonide-formoterol (SYMBICORT) 160-4.5 mcg/act inhaler Inhale 2 puffs 2 (two) times a day Rinse mouth after use., Historical Med      !! budesonide-formoterol (Symbicort) 160-4.5 mcg/act inhaler Inhale 2 puffs 2 (two) times a day At 8AM and HS.Rinse mouth after use., Starting Mon 1/15/2024, Normal      buPROPion (WELLBUTRIN XL) 150 mg 24 hr tablet Take 1 tablet (150 mg total) by mouth daily, Starting Fri 7/26/2024, Until Tue 9/24/2024, Normal      Butalbital-APAP-Caffeine (Fioricet) -40 MG CAPS Take 2 tablets by mouth every 4 (four) hours as needed (max 8qd) for up to 20 doses, Starting Fri 5/10/2024, Normal      dextromethorphan-guaifenesin (MUCINEX DM)  MG per 12 hr tablet Take 1 tablet by mouth every 12 (twelve) hours as needed (Cough, congestion), Starting Wed 9/25/2024, Normal      doxycycline hyclate (VIBRAMYCIN) 100 mg capsule Take 1 capsule (100 mg total) by mouth 2 (two) times a day for 7 days, Starting Thu 9/26/2024, Until Thu 10/3/2024, Normal      ergocalciferol (VITAMIN D2) 50,000 units Take 1 capsule (50,000 Units total) by mouth once a week for 7 doses, Starting Tue 7/30/2024, Until Wed 9/11/2024, Normal      folic acid (FOLVITE) 1 mg tablet Take 1 tablet (1 mg total) by mouth daily, Starting Fri 7/26/2024, Until Tue 9/24/2024, Normal      ibuprofen (MOTRIN) 400 mg tablet Take 1 tablet (400 mg total) by mouth every 6 (six) hours as needed for headaches, Starting Wed 9/25/2024, Normal      ketorolac (TORADOL) 10 mg tablet Take 1 tablet (10 mg total) by mouth every 6  (six) hours as needed for moderate pain, Starting Fri 5/31/2024, Normal      Multiple Vitamin (Multivitamin Adult) TABS Take 1 pill daily, Normal      nicotine polacrilex (NICORETTE) 4 mg gum Chew 1 each (4 mg total) as needed for smoking cessation, Starting Fri 1/12/2024, Normal      sertraline (ZOLOFT) 100 mg tablet Take 2 tablets (200 mg total) by mouth daily, Starting Fri 7/26/2024, Until Tue 9/24/2024, Normal      topiramate (TOPAMAX) 25 mg tablet TAKE 1 TABLET BY MOUTH 2X DAILY @8AM-8PM (MIGRAINE) BIMAL, Normal      vitamin B-12 (CYANOCOBALAMIN) 250 MCG tablet Take 4 tablets (1,000 mcg total) by mouth daily, Starting Thu 7/25/2024, Until Mon 9/23/2024, Normal       !! - Potential duplicate medications found. Please discuss with provider.          No discharge procedures on file.          CHIEF COMPLAINT    Chief Complaint   Patient presents with    Shortness of Breath     Pt arriving EMS w reports of increased SOB since yesterday. Reports she came to ED yesterday and was dx w pneumonia. Unable to  abx. Pt reports unable to lay flat on back w coughing attacks and JAIN.          HPI    Herminia presents to the Emergency Department complaining of cough and congestion.  She was just diagnosed with pneumonia and is due to have her antibiotic prescription delivered tomorrow.  In the meanwhile she felt that she was getting worse.        HPI      Past Medical and Surgical History    Past Medical History:   Diagnosis Date    ADHD (attention deficit hyperactivity disorder)     Allergic     Anemia     Anxiety     Bipolar affective disorder ([facility]) current episode depressed, without psychotic features 7/23/2024    COPD (chronic obstructive pulmonary disease) ([facility])     Depression     Headache(784.0) 1980    Hemangioma     Hypertension     Panic attack     Panic disorder     Psychiatric illness     PTSD (post-traumatic stress disorder)     Self-injurious behavior     Sleep difficulties     Substance abuse ([facility])      Substance abuse ([facility]) 08/15/2023    Suicide attempt ([facility])     Visual impairment        Past Surgical History:   Procedure Laterality Date     SECTION       SECTION         Family History   Problem Relation Age of Onset    Cancer Mother     Autoimmune disease Mother     COPD Mother     Depression Mother     Dementia Mother     Alzheimer's disease Mother     Alzheimer's disease Father     Dementia Father     No Known Problems Sister     No Known Problems Brother     Depression Daughter     No Known Problems Maternal Grandmother     No Known Problems Maternal Grandfather     No Known Problems Paternal Grandmother     No Known Problems Paternal Grandfather     No Known Problems Maternal Aunt     No Known Problems Maternal Uncle     No Known Problems Paternal Aunt     No Known Problems Paternal Uncle     No Known Problems Cousin     ADD / ADHD Neg Hx     Alcohol abuse Neg Hx     Anxiety disorder Neg Hx     Bipolar disorder Neg Hx     Completed Suicide  Neg Hx     Drug abuse Neg Hx     OCD Neg Hx     Psychiatric Illness Neg Hx     Psychosis Neg Hx     Schizoaffective Disorder  Neg Hx     Schizophrenia Neg Hx     Self-Injury Neg Hx     Suicide Attempts Neg Hx        Social History     Tobacco Use    Smoking status: Every Day     Current packs/day: 0.50     Average packs/day: 0.5 packs/day for 35.0 years (17.5 ttl pk-yrs)     Types: Cigarettes    Smokeless tobacco: Never    Tobacco comments:     declines   Vaping Use    Vaping status: Some Days   Substance Use Topics    Alcohol use: Not Currently    Drug use: Not Currently     Types: Amphetamines, Methamphetamines     Comment: denies, reports positive uds is from [place]         .    Allergies    No Known Allergies    Home Medications    Prior to Admission medications    Medication Sig Start Date End Date Taking? Authorizing Provider   albuterol (PROVENTIL HFA,VENTOLIN HFA) 90 mcg/act inhaler  24   Historical Provider, MD   ARIPiprazole (ABILIFY) 20 MG  tablet Take 1 tablet (20 mg total) by mouth daily 7/25/24 9/23/24  Corina Balderrama MD   benzonatate (TESSALON PERLES) 100 mg capsule Take 1 capsule (100 mg total) by mouth 3 (three) times a day as needed for cough 9/25/24   Dl Lopez PA-C   budesonide-formoterol (SYMBICORT) 160-4.5 mcg/act inhaler Inhale 2 puffs 2 (two) times a day Rinse mouth after use.    Radha Callejas MD   budesonide-formoterol (Symbicort) 160-4.5 mcg/act inhaler Inhale 2 puffs 2 (two) times a day At 8AM and HS.Rinse mouth after use. 1/15/24   Yinka Mitchell DO   buPROPion (WELLBUTRIN XL) 150 mg 24 hr tablet Take 1 tablet (150 mg total) by mouth daily 7/26/24 9/24/24  Corina Balderrama MD   Butalbital-APAP-Caffeine (Fioricet) -40 MG CAPS Take 2 tablets by mouth every 4 (four) hours as needed (max 8qd) for up to 20 doses 5/10/24   Dl Lopez PA-C   dextromethorphan-guaifenesin (MUCINEX DM)  MG per 12 hr tablet Take 1 tablet by mouth every 12 (twelve) hours as needed (Cough, congestion) 9/25/24   Dl Lopez PA-C   doxycycline hyclate (VIBRAMYCIN) 100 mg capsule Take 1 capsule (100 mg total) by mouth 2 (two) times a day for 7 days 9/26/24 10/3/24  Dl Lopez PA-C   ergocalciferol (VITAMIN D2) 50,000 units Take 1 capsule (50,000 Units total) by mouth once a week for 7 doses 7/30/24 9/11/24  Corina Balderrama MD   folic acid (FOLVITE) 1 mg tablet Take 1 tablet (1 mg total) by mouth daily 7/26/24 9/24/24  Corina Balderrama MD   ibuprofen (MOTRIN) 400 mg tablet Take 1 tablet (400 mg total) by mouth every 6 (six) hours as needed for headaches 9/25/24   Dl Lopez PA-C   ketorolac (TORADOL) 10 mg tablet Take 1 tablet (10 mg total) by mouth every 6 (six) hours as needed for moderate pain 5/31/24   Yinka Mitchell, DO   Multiple Vitamin (Multivitamin Adult) TABS Take 1 pill daily 10/12/23   Yinka Mitchell, DO   nicotine polacrilex (NICORETTE) 4 mg gum Chew 1 each (4 mg total) as needed for smoking  cessation 1/12/24   Yinka Mitchell DO   sertraline (ZOLOFT) 100 mg tablet Take 2 tablets (200 mg total) by mouth daily 7/26/24 9/24/24  Corina Balderrama MD   topiramate (TOPAMAX) 25 mg tablet TAKE 1 TABLET BY MOUTH 2X DAILY @8AM-8PM (MIGRAINE) BIMAL 5/3/24   Yinka Mitchell DO   vitamin B-12 (CYANOCOBALAMIN) 250 MCG tablet Take 4 tablets (1,000 mcg total) by mouth daily 7/25/24 9/23/24  Corina Balderrama MD           Review of Systems    Review of Systems   Constitutional:  Negative for chills and fever.   HENT:  Negative for ear pain and sore throat.    Eyes:  Negative for pain and visual disturbance.   Respiratory:  Positive for cough, shortness of breath and wheezing.    Cardiovascular:  Negative for chest pain and palpitations.   Gastrointestinal:  Negative for abdominal pain and vomiting.   Genitourinary:  Negative for dysuria and hematuria.   Musculoskeletal:  Negative for arthralgias and back pain.   Skin:  Negative for color change and rash.   Neurological:  Negative for seizures and syncope.   All other systems reviewed and are negative.      Physical Exam      ED Triage Vitals [09/26/24 2040]   Temperature Pulse Respirations Blood Pressure SpO2   98.8 °F (37.1 °C) (!) 108 22 155/88 91 %      Temp Source Heart Rate Source Patient Position - Orthostatic VS BP Location FiO2 (%)   Oral Monitor Sitting Right arm --      Pain Score       --               Physical Exam  Vitals and nursing note reviewed.   Constitutional:       General: She is not in acute distress.     Appearance: She is well-developed.   HENT:      Head: Normocephalic and atraumatic.   Eyes:      Conjunctiva/sclera: Conjunctivae normal.   Cardiovascular:      Rate and Rhythm: Normal rate and regular rhythm.      Heart sounds: No murmur heard.  Pulmonary:      Effort: Pulmonary effort is normal. Tachypnea and prolonged expiration present. No respiratory distress.      Breath sounds: Decreased air movement present. Wheezing present.   Abdominal:       Palpations: Abdomen is soft.      Tenderness: There is no abdominal tenderness.   Musculoskeletal:         General: No swelling.      Cervical back: Neck supple.   Skin:     General: Skin is warm and dry.      Capillary Refill: Capillary refill takes less than 2 seconds.   Neurological:      Mental Status: She is alert.   Psychiatric:         Mood and Affect: Mood normal.           Assessment and Plan    Herminia De Paz is a 59 y.o. female who presents with recently diagnosed pneumonia not yet started on treatment. Physical examination remarkable for raspy cough. Differential diagnosis (not completely inclusive) includes worsening pneumonia. Plan will be to perform diagnostic testing and treat symptomatically.      MDM      Diagnostic Results      Labs:    Results for orders placed or performed during the hospital encounter of 09/26/24   ECG 12 lead    Collection Time: 09/26/24  8:49 PM   Result Value Ref Range    Ventricular Rate 100 BPM    Atrial Rate 100 BPM    [place] Interval 134 ms    QRSD Interval 76 ms    QT Interval 312 ms    QTC Interval 402 ms    P [place] 69 degrees    QRS Axis 63 degrees    T Wave Axis 78 degrees   Blood culture #1    Collection Time: 09/26/24  9:11 PM    Specimen: Arm, Left; Blood   Result Value Ref Range    Blood Culture No Growth at 24 hrs.    Blood culture #2    Collection Time: 09/26/24  9:11 PM    Specimen: Hand, Left; Blood   Result Value Ref Range    Blood Culture No Growth at 24 hrs.    CBC and differential    Collection Time: 09/26/24  9:11 PM   Result Value Ref Range    WBC 5.42 4.31 - 10.16 Thousand/uL    RBC 4.10 3.81 - 5.12 Million/uL    Hemoglobin 12.3 11.5 - 15.4 g/dL    Hematocrit 37.2 34.8 - 46.1 %    MCV 91 82 - 98 fL    MCH 30.0 26.8 - 34.3 pg    MCHC 33.1 31.4 - 37.4 g/dL    RDW 13.2 11.6 - 15.1 %    MPV 9.3 8.9 - 12.7 fL    Platelets 282 149 - 390 Thousands/uL    nRBC 0 /100 WBCs    Segmented % 72 43 - 75 %    Immature Grans % 0 0 - 2 %    Lymphocytes % 19 14 - 44 %     Monocytes % 8 4 - 12 %    Eosinophils Relative 1 0 - 6 %    Basophils Relative 0 0 - 1 %    Absolute Neutrophils 3.90 1.85 - 7.62 Thousands/µL    Absolute Immature Grans 0.02 0.00 - 0.20 Thousand/uL    Absolute Lymphocytes 1.00 0.60 - 4.47 Thousands/µL    Absolute Monocytes 0.44 0.17 - 1.22 Thousand/µL    Eosinophils Absolute 0.04 0.00 - 0.61 Thousand/µL    Basophils Absolute 0.02 0.00 - 0.10 Thousands/µL   Comprehensive metabolic panel    Collection Time: 09/26/24  9:11 PM   Result Value Ref Range    Sodium 133 (L) 135 - 147 mmol/L    Potassium 3.8 3.5 - 5.3 mmol/L    Chloride 101 96 - 108 mmol/L    CO2 22 21 - 32 mmol/L    ANION GAP 10 4 - 13 mmol/L    BUN 13 5 - 25 mg/dL    Creatinine 0.64 0.60 - 1.30 mg/dL    Glucose 92 65 - 140 mg/dL    Calcium 9.3 8.4 - 10.2 mg/dL    AST 19 13 - 39 U/L    ALT 17 7 - 52 U/L    Alkaline Phosphatase 75 34 - 104 U/L    Total Protein 7.2 6.4 - 8.4 g/dL    Albumin 4.3 3.5 - 5.0 g/dL    Total Bilirubin 0.37 0.20 - 1.00 mg/dL    eGFR 97 ml/min/1.73sq m       All labs reviewed and utilized in the medical decision making process    Radiology:    XR chest 2 views   Final Result      Mild bibasilar pneumonia, and small right pleural effusion.            Workstation performed: [id number]             All radiology studies independently viewed by me and interpreted by the radiologist.    Procedure    Procedures      ED Course of Care and Re-Assessments    Patient was feeling significantly better after treatment.    She was given abx here and would like to continue as an outpatient.     Return precautions discussed.     Medications   sodium chloride 0.9 % bolus 1,000 mL (0 mL Intravenous Stopped 9/26/24 2215)   ceftriaxone (ROCEPHIN) 1 g/50 mL in dextrose IVPB (0 mg Intravenous Stopped 9/26/24 2146)   azithromycin (ZITHROMAX) tablet 500 mg (500 mg Oral Given 9/26/24 2112)   ipratropium-albuterol (DUO-NEB) 0.5-2.5 mg/3 mL inhalation solution 3 mL (3 mL Nebulization Given 9/26/24 2113)    predniSONE tablet 60 mg (60 mg Oral Given 9/26/24 2112)   albuterol (PROVENTIL HFA,VENTOLIN HFA) inhaler 2 puff (2 puffs Inhalation Given 9/26/24 8466)                  Joanna Talavera, DO Joanna Talavera, DO  09/28/24 3012

## 2024-09-27 NOTE — ED NOTES
Provider at bedside.     Dariela [place] [facility] [place] [place]  09/26/24 Tamiko

## 2024-09-29 LAB
BACTERIA BLD CULT: NORMAL
BACTERIA BLD CULT: NORMAL

## 2024-10-01 LAB
BACTERIA BLD CULT: NORMAL
BACTERIA BLD CULT: NORMAL

## 2024-10-03 ENCOUNTER — TELEPHONE (OUTPATIENT)
Age: 59
End: 2024-10-03

## 2024-10-03 NOTE — TELEPHONE ENCOUNTER
[facility] pharmacy called and is requesting patients medication list to be faxed to [phone]. Please advise

## 2024-10-08 ENCOUNTER — PATIENT OUTREACH (OUTPATIENT)
Dept: CASE MANAGEMENT | Facility: OTHER | Age: 59
End: 2024-10-08

## 2024-10-08 NOTE — LETTER
[address] [facility] [place] [address]  [phone]    Re: Care Management   10/8/2024       Dear Herminia,    I was asked to contact you to offer you psychosocial support.  I have tried to contact you on a few occasions however I have not been able to reach you.  Please feel free to contact me at #[phone].  Thank you.         Sincerely,         Cathy Smith, MSW,LSW

## 2024-10-08 NOTE — PROGRESS NOTES
JULIA ENRIQUEZ has not received return call from patient to date.  Second call placed to patient today and message left.  Unable to Reach letter sent to patient via My Chart as well.  Will close referral and remain available to provide support.

## 2024-11-12 NOTE — TELEPHONE ENCOUNTER
Evening medications are a problem. Medications need to be ordered AM and HS instead of 8 AM and 8 PM. Patient is working later in the evening and needs her PM doses changed to HS. Order is being faxed over for Dr. Mitchell's signature.    No

## 2025-01-23 ENCOUNTER — TELEPHONE (OUTPATIENT)
Age: 60
End: 2025-01-23

## 2025-01-23 NOTE — TELEPHONE ENCOUNTER
Spoke to Pharmacist Vilma. I advise her per Dr. Mitchell to D/C Vitamin D and Vitamin B12. She states they will D/C

## 2025-01-23 NOTE — TELEPHONE ENCOUNTER
[facility] is asking for a d/c order for patients Vit D and B12. Please send to [facility] pharmacy.     Fax- [phone]  ESTEE Levy

## 2025-01-29 NOTE — TELEPHONE ENCOUNTER
Pt called to reschedule appt. She was unable to come in today for her 10:15 with Dr. Mitchell due to not having transportation. She is rescheduled for 2/22. Thank you.    Statement Selected

## 2025-04-08 ENCOUNTER — HOSPITAL ENCOUNTER (EMERGENCY)
Facility: HOSPITAL | Age: 60
Discharge: HOME | End: 2025-04-08
Attending: EMERGENCY MEDICINE

## 2025-04-08 VITALS
TEMPERATURE: 97.9 F | OXYGEN SATURATION: 98 % | SYSTOLIC BLOOD PRESSURE: 151 MMHG | DIASTOLIC BLOOD PRESSURE: 96 MMHG | HEART RATE: 87 BPM | RESPIRATION RATE: 18 BRPM

## 2025-04-08 DIAGNOSIS — I89.1 LYMPHANGITIS: ICD-10-CM

## 2025-04-08 DIAGNOSIS — L03.019 PARONYCHIA OF FINGER: Primary | ICD-10-CM

## 2025-04-08 PROCEDURE — 99283 EMERGENCY DEPT VISIT LOW MDM: CPT

## 2025-04-08 PROCEDURE — 99284 EMERGENCY DEPT VISIT MOD MDM: CPT | Performed by: EMERGENCY MEDICINE

## 2025-04-08 RX ORDER — CEPHALEXIN 500 MG/1
500 CAPSULE ORAL EVERY 6 HOURS SCHEDULED
Qty: 28 CAPSULE | Refills: 0 | Status: SHIPPED | OUTPATIENT
Start: 2025-04-08 | End: 2025-04-15

## 2025-04-08 NOTE — ED PROVIDER NOTES
Pt Name: Herminia De Paz  MRN: [id number]  Birthdate 1965  Age/Sex: 59 y.o. female  Date of evaluation: 4/8/2025  PCP: Andre Mitchell DO        FINAL IMPRESSION    Final diagnoses:   Paronychia of finger   Lymphangitis         DISPOSITION/PLAN      Time reflects when diagnosis was documented in both MDM as applicable and the Disposition within this note       Time User Action Codes Description Comment    4/8/2025  9:19 AM Joanna Talavera Add [L03.019] Paronychia of finger     4/8/2025  9:19 AM Joanna Talavera Add [I89.1] Lymphangitis           ED Disposition       ED Disposition   Discharge    Condition   Stable    Date/Time   Tue Apr 8, 2025  9:18 AM    Comment   Herminia De Paz discharge to home/self care.                   Follow-up Information       Follow up With Specialties Details Why Contact Info Additional Information    Yinka Mitchell DO Family Medicine Schedule an appointment as soon as possible for a visit   [address] [place] [phone]       [facility] Emergency Department Emergency Medicine Go to  As needed, If symptoms worsen [address] [place] [phone] [facility] Emergency Department, [address] [place] [address], [id number]              PATIENT REFERRED TO:    Yinka Mitchell DO  [address] [place] [phone]    Schedule an appointment as soon as possible for a visit       [facility] Emergency Department  [address] [place] [phone]  Go to   As needed, If symptoms worsen      DISCHARGE MEDICATIONS:    Discharge Medication List as of 4/8/2025  9:20 AM        START taking these medications    Details   cephalexin (KEFLEX) 500 mg capsule Take 1 capsule (500 mg total) by mouth every 6 (six) hours for 7 days, Starting Tue 4/8/2025, Until Tue 4/15/2025, Normal           CONTINUE these medications which  have NOT CHANGED    Details   albuterol (PROVENTIL HFA,VENTOLIN HFA) 90 mcg/act inhaler Historical Med      ARIPiprazole (ABILIFY) 20 MG tablet Take 1 tablet (20 mg total) by mouth daily, Starting Thu 7/25/2024, Until Mon 9/23/2024, Normal      benzonatate (TESSALON PERLES) 100 mg capsule Take 1 capsule (100 mg total) by mouth 3 (three) times a day as needed for cough, Starting Wed 9/25/2024, Normal      !! budesonide-formoterol (SYMBICORT) 160-4.5 mcg/act inhaler Inhale 2 puffs 2 (two) times a day Rinse mouth after use., Historical Med      !! budesonide-formoterol (Symbicort) 160-4.5 mcg/act inhaler Inhale 2 puffs 2 (two) times a day At 8AM and HS.Rinse mouth after use., Starting Mon 1/15/2024, Normal      buPROPion (WELLBUTRIN XL) 150 mg 24 hr tablet Take 1 tablet (150 mg total) by mouth daily, Starting Fri 7/26/2024, Until Tue 9/24/2024, Normal      Butalbital-APAP-Caffeine (Fioricet) -40 MG CAPS Take 2 tablets by mouth every 4 (four) hours as needed (max 8qd) for up to 20 doses, Starting Fri 5/10/2024, Normal      dextromethorphan-guaifenesin (MUCINEX DM)  MG per 12 hr tablet Take 1 tablet by mouth every 12 (twelve) hours as needed (Cough, congestion), Starting Wed 9/25/2024, Normal      ibuprofen (MOTRIN) 400 mg tablet Take 1 tablet (400 mg total) by mouth every 6 (six) hours as needed for headaches, Starting Wed 9/25/2024, Normal      ketorolac (TORADOL) 10 mg tablet Take 1 tablet (10 mg total) by mouth every 6 (six) hours as needed for moderate pain, Starting Fri 5/31/2024, Normal      Multiple Vitamin (Multivitamin Adult) TABS Take 1 pill daily, Normal      nicotine polacrilex (NICORETTE) 4 mg gum Chew 1 each (4 mg total) as needed for smoking cessation, Starting Fri 1/12/2024, Normal      promethazine-dextromethorphan (PHENERGAN-DM) 6.25-15 mg/5 mL oral syrup Take 5 mL by mouth 4 (four) times a day as needed for cough, Starting Thu 9/26/2024, Normal      sertraline (ZOLOFT) 100 mg tablet Take 2  tablets (200 mg total) by mouth daily, Starting 2024, Until 2024, Normal      topiramate (TOPAMAX) 25 mg tablet TAKE 1 TABLET BY MOUTH 2X DAILY @8AM-8PM (MIGRAINE) Nicholas WALLIS       !! - Potential duplicate medications found. Please discuss with provider.          No discharge procedures on file.          CHIEF COMPLAINT    Chief Complaint   Patient presents with    Finger Swelling     Pt c/o left index finger pain and swelling since Saturday.          HPI    Herminia presents to the Emergency Department complaining of pain and swelling to nail fold of left index finger.  There is also some redness to the left forearm that is tender as well.  No injury to the area.  It has not been draining.  No prior history of similar.       HPI      Past Medical and Surgical History    Past Medical History:   Diagnosis Date    ADHD (attention deficit hyperactivity disorder)     Allergic     Anemia     Anxiety     Bipolar affective disorder ([facility]) current episode depressed, without psychotic features 2024    COPD (chronic obstructive pulmonary disease) ([facility])     Depression     Headache(784.0) 1980    Hemangioma     Hypertension     Panic attack     Panic disorder     Psychiatric illness     PTSD (post-traumatic stress disorder)     Self-injurious behavior     Sleep difficulties     Substance abuse ([facility])     Substance abuse ([facility]) 08/15/2023    Suicide attempt ([facility])     Visual impairment        Past Surgical History:   Procedure Laterality Date     SECTION       SECTION         Family History   Problem Relation Age of Onset    Cancer Mother     Autoimmune disease Mother     COPD Mother     Depression Mother     Dementia Mother     Alzheimer's disease Mother     Alzheimer's disease Father     Dementia Father     No Known Problems Sister     No Known Problems Brother     Depression Daughter     No Known Problems Maternal Grandmother     No Known Problems Maternal Grandfather     No Known Problems  Paternal Grandmother     No Known Problems Paternal Grandfather     No Known Problems Maternal Aunt     No Known Problems Maternal Uncle     No Known Problems Paternal Aunt     No Known Problems Paternal Uncle     No Known Problems Cousin     ADD / ADHD Neg Hx     Alcohol abuse Neg Hx     Anxiety disorder Neg Hx     Bipolar disorder Neg Hx     Completed Suicide  Neg Hx     Drug abuse Neg Hx     OCD Neg Hx     Psychiatric Illness Neg Hx     Psychosis Neg Hx     Schizoaffective Disorder  Neg Hx     Schizophrenia Neg Hx     Self-Injury Neg Hx     Suicide Attempts Neg Hx        Social History     Tobacco Use    Smoking status: Every Day     Current packs/day: 0.50     Average packs/day: 0.5 packs/day for 35.0 years (17.5 ttl pk-yrs)     Types: Cigarettes    Smokeless tobacco: Never    Tobacco comments:     declines   Vaping Use    Vaping status: Some Days   Substance Use Topics    Alcohol use: Not Currently    Drug use: Not Currently     Types: Amphetamines, Methamphetamines     Comment: denies, reports positive uds is from allegra         .    Allergies    No Known Allergies    Home Medications    Prior to Admission medications    Medication Sig Start Date End Date Taking? Authorizing Provider   albuterol (PROVENTIL HFA,VENTOLIN HFA) 90 mcg/act inhaler  1/19/24   Historical Provider, MD   ARIPiprazole (ABILIFY) 20 MG tablet Take 1 tablet (20 mg total) by mouth daily 7/25/24 9/23/24  Corina Kaiser MD   benzonatate (TESSALON PERLES) 100 mg capsule Take 1 capsule (100 mg total) by mouth 3 (three) times a day as needed for cough 9/25/24   Dl Lopez PA-C   budesonide-formoterol (SYMBICORT) 160-4.5 mcg/act inhaler Inhale 2 puffs 2 (two) times a day Rinse mouth after use.    Historical Provider, MD   budesonide-formoterol (Symbicort) 160-4.5 mcg/act inhaler Inhale 2 puffs 2 (two) times a day At 8AM and HS.Rinse mouth after use. 1/15/24   Yinka Mitchell DO   buPROPion (WELLBUTRIN XL) 150 mg 24 hr tablet Take 1  tablet (150 mg total) by mouth daily 7/26/24 9/24/24  Corina Kaiser MD   Butalbital-APAP-Caffeine (Fioricet) -40 MG CAPS Take 2 tablets by mouth every 4 (four) hours as needed (max 8qd) for up to 20 doses 5/10/24   Dl Lopez PA-C   dextromethorphan-guaifenesin (MUCINEX DM)  MG per 12 hr tablet Take 1 tablet by mouth every 12 (twelve) hours as needed (Cough, congestion) 9/25/24   Dl Lopez PA-C   ibuprofen (MOTRIN) 400 mg tablet Take 1 tablet (400 mg total) by mouth every 6 (six) hours as needed for headaches 9/25/24   Dl Lopez PA-C   ketorolac (TORADOL) 10 mg tablet Take 1 tablet (10 mg total) by mouth every 6 (six) hours as needed for moderate pain 5/31/24   Yinka Mitchell, DO   Multiple Vitamin (Multivitamin Adult) TABS Take 1 pill daily 10/12/23   Yinka Mitchell DO   nicotine polacrilex (NICORETTE) 4 mg gum Chew 1 each (4 mg total) as needed for smoking cessation 1/12/24   Yinka Mitchell, DO   promethazine-dextromethorphan (PHENERGAN-DM) 6.25-15 mg/5 mL oral syrup Take 5 mL by mouth 4 (four) times a day as needed for cough 9/26/24   Joanna Talavera, DO   sertraline (ZOLOFT) 100 mg tablet Take 2 tablets (200 mg total) by mouth daily 7/26/24 9/24/24  Corina Kaiser MD   topiramate (TOPAMAX) 25 mg tablet TAKE 1 TABLET BY MOUTH 2X DAILY @8AM-8PM (MIGRAINE) BIMAL 5/3/24   Yinka Mitchell DO           Review of Systems    Review of Systems   Constitutional:  Negative for chills and fever.   HENT:  Negative for ear pain and sore throat.    Eyes:  Negative for pain and visual disturbance.   Respiratory:  Negative for cough and shortness of breath.    Cardiovascular:  Negative for chest pain and palpitations.   Gastrointestinal:  Negative for abdominal pain and vomiting.   Genitourinary:  Negative for dysuria and hematuria.   Musculoskeletal:  Negative for arthralgias and back pain.   Skin:  Positive for color change, rash and wound.   Neurological:  Negative for seizures  and syncope.   All other systems reviewed and are negative.      Physical Exam      ED Triage Vitals [04/08/25 0901]   Temperature Pulse Respirations Blood Pressure SpO2   97.9 °F (36.6 °C) 87 18 151/96 98 %      Temp Source Heart Rate Source Patient Position - Orthostatic VS BP Location FiO2 (%)   Oral Monitor -- Right arm --      Pain Score       --               Physical Exam  Vitals and nursing note reviewed.   Constitutional:       General: She is not in acute distress.     Appearance: She is well-developed.   HENT:      Head: Normocephalic and atraumatic.   Eyes:      Conjunctiva/sclera: Conjunctivae normal.   Cardiovascular:      Rate and Rhythm: Normal rate and regular rhythm.      Heart sounds: No murmur heard.  Pulmonary:      Effort: Pulmonary effort is normal. No respiratory distress.      Breath sounds: Normal breath sounds.   Abdominal:      Palpations: Abdomen is soft.      Tenderness: There is no abdominal tenderness.   Musculoskeletal:         General: No swelling.      Cervical back: Neck supple.   Skin:     General: Skin is warm and dry.      Capillary Refill: Capillary refill takes less than 2 seconds.      Findings: Bruising and erythema present.      Comments: Left index finger with cracked skin, surrounding erythema and swelling as well as ecchymosis.  There is also a small area of erythema up the left forearm.    Neurological:      Mental Status: She is alert.   Psychiatric:         Mood and Affect: Mood normal.                Assessment and Plan    Herminia De Paz is a 59 y.o. female who presents with pain and swelling to left index finger. Physical examination remarkable for swelling and tenderness with no active drainage. Differential diagnosis (not completely inclusive) includes paronychia with an area of lymphangitis proximally. Plan will be to perform diagnostic testing and treat symptomatically.      MDM      Diagnostic Results    Procedure    Procedures      ED Course of Care  and Re-Assessments  Discussed warm soaks with patient several times a day.  Oral abx sent to the pharmacy and should be started today.  Return precautions discussed at length.     Medications - No data to display               DO Joanna Morales DO  04/08/25 1551

## 2025-05-08 ENCOUNTER — APPOINTMENT (OUTPATIENT)
Dept: URGENT CARE | Age: 60
End: 2025-05-08